# Patient Record
Sex: MALE | Race: WHITE | Employment: OTHER | URBAN - METROPOLITAN AREA
[De-identification: names, ages, dates, MRNs, and addresses within clinical notes are randomized per-mention and may not be internally consistent; named-entity substitution may affect disease eponyms.]

---

## 2018-08-13 ENCOUNTER — OFFICE VISIT (OUTPATIENT)
Dept: OPTOMETRY | Facility: CLINIC | Age: 78
End: 2018-08-13
Payer: MEDICARE

## 2018-08-13 DIAGNOSIS — H04.129 DRY EYE: ICD-10-CM

## 2018-08-13 DIAGNOSIS — E11.9 DIABETES MELLITUS WITHOUT OPHTHALMIC MANIFESTATIONS (H): ICD-10-CM

## 2018-08-13 DIAGNOSIS — H02.889 MEIBOMIAN GLAND DYSFUNCTION: ICD-10-CM

## 2018-08-13 DIAGNOSIS — H52.13 MYOPIA OF BOTH EYES WITH ASTIGMATISM: Primary | ICD-10-CM

## 2018-08-13 DIAGNOSIS — H52.203 MYOPIA OF BOTH EYES WITH ASTIGMATISM: Primary | ICD-10-CM

## 2018-08-13 DIAGNOSIS — Z96.1 PSEUDOPHAKIA OF BOTH EYES: ICD-10-CM

## 2018-08-13 PROBLEM — M17.10 ARTHRITIS OF KNEE: Status: ACTIVE | Noted: 2017-10-27

## 2018-08-13 PROCEDURE — 92015 DETERMINE REFRACTIVE STATE: CPT | Mod: GY | Performed by: OPTOMETRIST

## 2018-08-13 PROCEDURE — 92014 COMPRE OPH EXAM EST PT 1/>: CPT | Performed by: OPTOMETRIST

## 2018-08-13 RX ORDER — GLIPIZIDE 5 MG/1
10 TABLET, FILM COATED, EXTENDED RELEASE ORAL EVERY MORNING
COMMUNITY
Start: 2017-06-15

## 2018-08-13 RX ORDER — ASPIRIN 325 MG
325 TABLET ORAL AT BEDTIME
Status: ON HOLD | COMMUNITY
End: 2019-03-08

## 2018-08-13 RX ORDER — ATORVASTATIN CALCIUM 40 MG/1
40 TABLET, FILM COATED ORAL DAILY
COMMUNITY
Start: 2016-12-21

## 2018-08-13 RX ORDER — LOSARTAN POTASSIUM AND HYDROCHLOROTHIAZIDE 12.5; 1 MG/1; MG/1
1 TABLET ORAL
Status: ON HOLD | COMMUNITY
Start: 2017-06-15 | End: 2019-03-07

## 2018-08-13 RX ORDER — LANCETS
EACH MISCELLANEOUS
COMMUNITY
Start: 2018-03-12

## 2018-08-13 ASSESSMENT — REFRACTION_WEARINGRX
OS_ADD: +2.50
SPECS_TYPE: BIFOCAL
OD_CYLINDER: +3.00
OS_SPHERE: -3.50
OD_AXIS: 022
OS_CYLINDER: +4.00
OD_ADD: +2.50
OS_AXIS: 156
OD_SPHERE: -1.25

## 2018-08-13 ASSESSMENT — CONF VISUAL FIELD
OD_NORMAL: 1
OS_NORMAL: 1

## 2018-08-13 ASSESSMENT — TONOMETRY
OS_IOP_MMHG: 16
IOP_METHOD: APPLANATION
OD_IOP_MMHG: 16

## 2018-08-13 ASSESSMENT — CUP TO DISC RATIO
OS_RATIO: 0.2
OD_RATIO: 0.3

## 2018-08-13 ASSESSMENT — EXTERNAL EXAM - LEFT EYE: OS_EXAM: NORMAL

## 2018-08-13 ASSESSMENT — VISUAL ACUITY
OD_CC: 20/40
OS_SC: 20/70
OD_SC+: -1
OS_CC: 20/40
METHOD: SNELLEN - LINEAR
CORRECTION_TYPE: GLASSES
OS_CC: 20/60
OS_SC+: -1
OD_CC: 20/60
OD_SC: 20/70

## 2018-08-13 ASSESSMENT — REFRACTION_MANIFEST
OD_AXIS: 020
OS_SPHERE: -3.50
OS_ADD: +2.50
OS_CYLINDER: +2.75
OS_AXIS: 155
OD_SPHERE: -1.75
OD_CYLINDER: +2.00
OD_ADD: +2.50

## 2018-08-13 ASSESSMENT — SLIT LAMP EXAM - LIDS
COMMENTS: MEIBOMIAN GLAND DYSFUNCTION
COMMENTS: MEIBOMIAN GLAND DYSFUNCTION

## 2018-08-13 ASSESSMENT — EXTERNAL EXAM - RIGHT EYE: OD_EXAM: NORMAL

## 2018-08-13 NOTE — MR AVS SNAPSHOT
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 A single row of these glands is present in each lid with openings along the lid margins.  It is often found in association with skin conditions such as rosacea and seborrheic dermatitis.    Symptoms include:  ?Red eyes  ?Gritty or burning sensation  ?Excessive tearing  ?Itchy eyelids  ?Red, swollen eyelids  ?Crusting or matting of eyelashes in the morning  ?Light sensitivity  ?Blurred vision    It is important to keep cosmetics from blocking these oil glands. If blocked, they do not   excrete oil into the tear film, which causes the tears to evaporate quickly.   This may result in watery eyes.  There is also an increase of bacterial growth when the tear film is unstable, leading to further ocular surface inflammation.    Warm compresses are very beneficial to the normal functioning of the eye.  Warm compresses for 5-10 minutes twice daily and keeping the lid margins clean  and help maintain a good tear film.   Moisten a washcloth with hot water, or microwave for 10 seconds, being careful to not get the cloth too hot.   Then put the washcloth onto your eyelids for 5 minutes. It will cool quickly so a rice pack or eyemask that can be heated and laid on top of the washcloth will help retain the heat.    Omega 3 fatty acid supplements taken once to twice daily and  "artificial tears such as Soothe xp, Refresh optive , and systane balance are also an additional treatment to control inflammation and help soothe your eyes.    Diabetes weakens the blood vessels all over the body, including the eyes. Damage to the blood vessels in the eyes can cause swelling or bleeding into part of the eye (called the retina). This is called diabetic retinopathy (MARCELA-tin-AH-puh-thee). If not treated, this disease can cause vision loss or blindness.   Symptoms may include blurred or distorted vision, but many people have no symptoms. It's important to see your eye doctor regularly to check for problems.   Early treatment and good control can help protect your vision. Here are the things you can do to help prevent vision loss:      1. Keep your blood sugar levels under tight control.      2. Bring high blood pressure under control.      3. No smoking.      4. Have yearly dilated eye exams.                Follow-ups after your visit        Who to contact     If you have questions or need follow up information about today's clinic visit or your schedule please contact [facility] directly at [phone].  Normal or non-critical lab and imaging results will be communicated to you by [employer]hart, letter or phone within 4 business days after the clinic has received the results. If you do not hear from us within 7 days, please contact the clinic through [employer] or phone. If you have a critical or abnormal lab result, we will notify you by phone as soon as possible.  Submit refill requests through [employer] or call your pharmacy and they will forward the refill request to us. Please allow 3 business days for your refill to be completed.          Additional Information About Your Visit        [facility] Information     [facility] [facility] lets you send messages to your doctor, view your test results, renew your prescriptions, schedule appointments and more. To sign up, go to [email or web address] . Click on \"Log " "in\" on the left side of the screen, which will take you to the Welcome page. Then click on \"Sign up Now\" on the right side of the page.     You will be asked to enter the access code listed below, as well as some personal information. Please follow the directions to create your username and password.     Your access code is: [email or web address]  Expires: 2018  3:11 PM     Your access code will  in 90 days. If you need help or a new code, please call your [place] clinic or [phone].        Care EveryWhere ID     This is your Care EveryWhere ID. This could be used by other organizations to access your [place] medical records  [id number]         Blood Pressure from Last 3 Encounters:   No data found for BP    Weight from Last 3 Encounters:   No data found for Wt              Today, you had the following     No orders found for display       Primary Care Provider Office Phone # Fax #    Yared Velásquez [phone]       [facility] [address] [facility] [address]        Equal Access to Services     [facility]: Hadii aad ku hadasho Socandaceali, waaxda luqadaha, qaybta kaalmada adereyes, pia bishop . So [facility] [place] [phone].    ATENCIÓN: Si habla español, tiene a mahmood disposición servicios gratuitos de asistencia lingüística. Jadyn al [phone].    We comply with applicable federal civil rights laws and [place] laws. We do not discriminate on the basis of race, color, national origin, age, disability, sex, sexual orientation, or gender identity.            Thank you!     Thank you for choosing [facility]  for your care. Our goal is always to provide you with excellent care. Hearing back from our patients is one way we can continue to improve our services. Please take a few minutes to complete the written survey that you may receive in the mail after your visit with us. Thank you!             Your Updated Medication List - " Protect others around you: Learn how to safely use, store and throw away your medicines at www.disposemymeds.org.          This list is accurate as of 8/13/18  3:11 PM.  Always use your most recent med list.                   Brand Name Dispense Instructions for use Diagnosis    aspirin 325 MG tablet      Take 325 mg by mouth        atorvastatin 40 MG tablet    LIPITOR     Take 40 mg by mouth        blood glucose monitoring lancets      USE TO TEST ONCE TO TWICE DAILY        blood glucose monitoring test strip    no brand specified     USE TO TEST ONCE TO TWICE DAILY        glipiZIDE 5 MG 24 hr tablet    GLUCOTROL XL     Take 10 mg by mouth        losartan-hydrochlorothiazide 100-12.5 MG per tablet    HYZAAR     Take 1 tablet by mouth        metFORMIN 500 MG tablet    GLUCOPHAGE     TAKE TWO TABLETS BY MOUTH EVERY MORNING AND ONE TABLET IN THE PM.        VITAMIN D (ERGOCALCIFEROL) PO      Take 200 Int'l Units by mouth

## 2018-08-13 NOTE — LETTER
8/13/2018         RE: Aramis Flores  [address] [facility] [address]        Dear Colleague,    [facility]. Please see a copy of my visit note below.    Chief Complaint   Patient presents with     Diabetic Eye Exam      Last A1c was too high increased Metformin and is seeing Dr Sierra in one month   100-130 in am checks in morning   Happy with cat surgery   Last Eye Exam: 2yrs  Dilated Previously: Yes    What are you currently using to see?  glasses  POHX +7.00 pre CE   OD: +1.75 - 2.25 X 111 VA: 20/40  OS: +0.50 - 3.00 X 064 VA: 20/40+2  ADD: + 2.50     Distance Vision Acuity: Satisfied with vision    Near Vision Acuity: Satisfied with vision while reading  with glasses    Eye Comfort: good  Do you use eye drops? : No        Medical, surgical and family histories reviewed and updated 8/13/2018.       OBJECTIVE: See Ophthalmology exam    ASSESSMENT:    ICD-10-CM    1. Myopia of both eyes with astigmatism H52.13 EYE EXAM (SIMPLE-NONBILLABLE)    H52.203 REFRACTION   2. Meibomian gland dysfunction H02.89 EYE EXAM (SIMPLE-NONBILLABLE)   3. Dry eye H04.129    4. Pseudophakia of both eyes Z96.1 EYE EXAM (SIMPLE-NONBILLABLE)   5. 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        Sincerely,        Nellie Durbin, OD

## 2018-08-13 NOTE — PATIENT INSTRUCTIONS
Meibomian gland dysfunction or Posterior Blepharitis, is characterized by inflammation along both the uppper and lower eyelid margins. A single row of these glands is present in each lid with openings along the lid margins.  It is often found in association with skin conditions such as rosacea and seborrheic dermatitis.    Symptoms include:  ?Red eyes  ?Gritty or burning sensation  ?Excessive tearing  ?Itchy eyelids  ?Red, swollen eyelids  ?Crusting or matting of eyelashes in the morning  ?Light sensitivity  ?Blurred vision    It is important to keep cosmetics from blocking these oil glands. If blocked, they do not   excrete oil into the tear film, which causes the tears to evaporate quickly.   This may result in watery eyes.  There is also an increase of bacterial growth when the tear film is unstable, leading to further ocular surface inflammation.    Warm compresses are very beneficial to the normal functioning of the eye.  Warm compresses for 5-10 minutes twice daily and keeping the lid margins clean  and help maintain a good tear film.   Moisten a washcloth with hot water, or microwave for 10 seconds, being careful to not get the cloth too hot.   Then put the washcloth onto your eyelids for 5 minutes. It will cool quickly so a rice pack or eyemask that can be heated and laid on top of the washcloth will help retain the heat.    Omega 3 fatty acid supplements taken once to twice daily and artificial tears such as Soothe xp, Refresh optive , and systane balance are also an additional treatment to control inflammation and help soothe your eyes.    Diabetes weakens the blood vessels all over the body, including the eyes. Damage to the blood vessels in the eyes can cause swelling or bleeding into part of the eye (called the retina). This is called diabetic retinopathy (MARCELA-tin--puh-thee). If not treated, this disease can cause vision loss or blindness.   Symptoms may include blurred or distorted vision, but many  people have no symptoms. It's important to see your eye doctor regularly to check for problems.   Early treatment and good control can help protect your vision. Here are the things you can do to help prevent vision loss:      1. Keep your blood sugar levels under tight control.      2. Bring high blood pressure under control.      3. No smoking.      4. Have yearly dilated eye exams.

## 2018-08-13 NOTE — PROGRESS NOTES
Chief Complaint   Patient presents with     Diabetic Eye Exam      Last A1c was too high increased Metformin and is seeing Dr Sierra in one month   100-130 in am checks in morning   Happy with cat surgery   Last Eye Exam: 2yrs  Dilated Previously: Yes    What are you currently using to see?  glasses  POHX +7.00 pre CE   OD: +1.75 - 2.25 X 111 VA: 20/40  OS: +0.50 - 3.00 X 064 VA: 20/40+2  ADD: + 2.50     Distance Vision Acuity: Satisfied with vision    Near Vision Acuity: Satisfied with vision while reading  with glasses    Eye Comfort: good  Do you use eye drops? : No        Medical, surgical and family histories reviewed and updated 8/13/2018.       OBJECTIVE: See Ophthalmology exam    ASSESSMENT:    ICD-10-CM    1. Myopia of both eyes with astigmatism H52.13 EYE EXAM (SIMPLE-NONBILLABLE)    H52.203 REFRACTION   2. Meibomian gland dysfunction H02.89 EYE EXAM (SIMPLE-NONBILLABLE)   3. Dry eye H04.129    4. Pseudophakia of both eyes Z96.1 EYE EXAM (SIMPLE-NONBILLABLE)   5. 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

## 2019-02-18 ENCOUNTER — HOSPITAL ENCOUNTER (OUTPATIENT)
Dept: LAB | Facility: CLINIC | Age: 79
Discharge: HOME | End: 2019-02-18
Attending: ORTHOPAEDIC SURGERY | Admitting: ORTHOPAEDIC SURGERY
Payer: MEDICARE

## 2019-02-18 DIAGNOSIS — Z01.812 PRE-OPERATIVE LABORATORY EXAMINATION: ICD-10-CM

## 2019-02-18 LAB
MRSA DNA SPEC QL NAA+PROBE: NEGATIVE
SPECIMEN SOURCE: NORMAL

## 2019-02-18 PROCEDURE — 87640 STAPH A DNA AMP PROBE: CPT | Performed by: ORTHOPAEDIC SURGERY

## 2019-02-18 PROCEDURE — 40000830 ZZHCL STATISTIC STAPH AUREUS METH RESIST SCREEN PCR: Performed by: ORTHOPAEDIC SURGERY

## 2019-02-18 PROCEDURE — 40000829 ZZHCL STATISTIC STAPH AUREUS SUSCEPT SCREEN PCR: Performed by: ORTHOPAEDIC SURGERY

## 2019-02-18 PROCEDURE — 87641 MR-STAPH DNA AMP PROBE: CPT | Performed by: ORTHOPAEDIC SURGERY

## 2019-03-07 ENCOUNTER — ANESTHESIA (OUTPATIENT)
Dept: SURGERY | Facility: CLINIC | Age: 79
End: 2019-03-07
Payer: MEDICARE

## 2019-03-07 ENCOUNTER — APPOINTMENT (OUTPATIENT)
Dept: GENERAL RADIOLOGY | Facility: CLINIC | Age: 79
End: 2019-03-07
Attending: ORTHOPAEDIC SURGERY
Payer: MEDICARE

## 2019-03-07 ENCOUNTER — ANESTHESIA EVENT (OUTPATIENT)
Dept: SURGERY | Facility: CLINIC | Age: 79
End: 2019-03-07
Payer: MEDICARE

## 2019-03-07 ENCOUNTER — HOSPITAL ENCOUNTER (OUTPATIENT)
Facility: CLINIC | Age: 79
Discharge: HOME | End: 2019-03-08
Attending: ORTHOPAEDIC SURGERY | Admitting: ORTHOPAEDIC SURGERY
Payer: MEDICARE

## 2019-03-07 DIAGNOSIS — Z96.652 STATUS POST TOTAL KNEE REPLACEMENT, LEFT: Primary | ICD-10-CM

## 2019-03-07 LAB
CREAT SERPL-MCNC: 1.12 MG/DL (ref 0.66–1.25)
GFR SERPL CREATININE-BSD FRML MDRD: 62 ML/MIN/{1.73_M2}
GLUCOSE BLDC GLUCOMTR-MCNC: 109 MG/DL (ref 70–99)
GLUCOSE BLDC GLUCOMTR-MCNC: 115 MG/DL (ref 70–99)
GLUCOSE BLDC GLUCOMTR-MCNC: 140 MG/DL (ref 70–99)
GLUCOSE BLDC GLUCOMTR-MCNC: 244 MG/DL (ref 70–99)
GLUCOSE SERPL-MCNC: 150 MG/DL (ref 70–99)
HGB BLD-MCNC: 13.1 G/DL (ref 13.3–17.7)
POTASSIUM SERPL-SCNC: 4.2 MMOL/L (ref 3.4–5.3)

## 2019-03-07 PROCEDURE — 84132 ASSAY OF SERUM POTASSIUM: CPT | Performed by: PHYSICIAN ASSISTANT

## 2019-03-07 PROCEDURE — 27810169 ZZH OR IMPLANT GENERAL: Performed by: ORTHOPAEDIC SURGERY

## 2019-03-07 PROCEDURE — 99204 OFFICE O/P NEW MOD 45 MIN: CPT | Performed by: PHYSICIAN ASSISTANT

## 2019-03-07 PROCEDURE — 25000128 H RX IP 250 OP 636: Performed by: ORTHOPAEDIC SURGERY

## 2019-03-07 PROCEDURE — 25000132 ZZH RX MED GY IP 250 OP 250 PS 637: Mod: GY | Performed by: PHYSICIAN ASSISTANT

## 2019-03-07 PROCEDURE — 25800030 ZZH RX IP 258 OP 636: Performed by: SURGERY

## 2019-03-07 PROCEDURE — 71000012 ZZH RECOVERY PHASE 1 LEVEL 1 FIRST HR: Performed by: ORTHOPAEDIC SURGERY

## 2019-03-07 PROCEDURE — 25000125 ZZHC RX 250: Performed by: PHYSICIAN ASSISTANT

## 2019-03-07 PROCEDURE — A9270 NON-COVERED ITEM OR SERVICE: HCPCS | Mod: GY | Performed by: PHYSICIAN ASSISTANT

## 2019-03-07 PROCEDURE — 88304 TISSUE EXAM BY PATHOLOGIST: CPT | Mod: 26 | Performed by: ORTHOPAEDIC SURGERY

## 2019-03-07 PROCEDURE — 25800030 ZZH RX IP 258 OP 636: Performed by: NURSE ANESTHETIST, CERTIFIED REGISTERED

## 2019-03-07 PROCEDURE — 25000125 ZZHC RX 250: Performed by: NURSE ANESTHETIST, CERTIFIED REGISTERED

## 2019-03-07 PROCEDURE — 40000986 XR KNEE PORT LT 1/2 VW: Mod: LT

## 2019-03-07 PROCEDURE — 25000125 ZZHC RX 250: Performed by: ORTHOPAEDIC SURGERY

## 2019-03-07 PROCEDURE — 25000128 H RX IP 250 OP 636: Performed by: NURSE ANESTHETIST, CERTIFIED REGISTERED

## 2019-03-07 PROCEDURE — 37000009 ZZH ANESTHESIA TECHNICAL FEE, EACH ADDTL 15 MIN: Performed by: ORTHOPAEDIC SURGERY

## 2019-03-07 PROCEDURE — A9270 NON-COVERED ITEM OR SERVICE: HCPCS | Mod: GY | Performed by: ORTHOPAEDIC SURGERY

## 2019-03-07 PROCEDURE — 36415 COLL VENOUS BLD VENIPUNCTURE: CPT | Performed by: PHYSICIAN ASSISTANT

## 2019-03-07 PROCEDURE — 25000128 H RX IP 250 OP 636: Performed by: ANESTHESIOLOGY

## 2019-03-07 PROCEDURE — 37000008 ZZH ANESTHESIA TECHNICAL FEE, 1ST 30 MIN: Performed by: ORTHOPAEDIC SURGERY

## 2019-03-07 PROCEDURE — 85018 HEMOGLOBIN: CPT | Performed by: PHYSICIAN ASSISTANT

## 2019-03-07 PROCEDURE — 40000170 ZZH STATISTIC PRE-PROCEDURE ASSESSMENT II: Performed by: ORTHOPAEDIC SURGERY

## 2019-03-07 PROCEDURE — 27210794 ZZH OR GENERAL SUPPLY STERILE: Performed by: ORTHOPAEDIC SURGERY

## 2019-03-07 PROCEDURE — 82565 ASSAY OF CREATININE: CPT | Performed by: PHYSICIAN ASSISTANT

## 2019-03-07 PROCEDURE — 25800025 ZZH RX 258: Performed by: ORTHOPAEDIC SURGERY

## 2019-03-07 PROCEDURE — 36000093 ZZH SURGERY LEVEL 4 1ST 30 MIN: Performed by: ORTHOPAEDIC SURGERY

## 2019-03-07 PROCEDURE — C1776 JOINT DEVICE (IMPLANTABLE): HCPCS | Performed by: ORTHOPAEDIC SURGERY

## 2019-03-07 PROCEDURE — 36000063 ZZH SURGERY LEVEL 4 EA 15 ADDTL MIN: Performed by: ORTHOPAEDIC SURGERY

## 2019-03-07 PROCEDURE — 88311 DECALCIFY TISSUE: CPT | Performed by: ORTHOPAEDIC SURGERY

## 2019-03-07 PROCEDURE — 82947 ASSAY GLUCOSE BLOOD QUANT: CPT | Mod: 91 | Performed by: PHYSICIAN ASSISTANT

## 2019-03-07 PROCEDURE — 25000132 ZZH RX MED GY IP 250 OP 250 PS 637: Mod: GY | Performed by: ORTHOPAEDIC SURGERY

## 2019-03-07 PROCEDURE — 82962 GLUCOSE BLOOD TEST: CPT | Mod: 91

## 2019-03-07 PROCEDURE — 25800030 ZZH RX IP 258 OP 636: Performed by: PHYSICIAN ASSISTANT

## 2019-03-07 PROCEDURE — 88311 DECALCIFY TISSUE: CPT | Mod: 26 | Performed by: ORTHOPAEDIC SURGERY

## 2019-03-07 PROCEDURE — 25800030 ZZH RX IP 258 OP 636: Performed by: ORTHOPAEDIC SURGERY

## 2019-03-07 PROCEDURE — 88304 TISSUE EXAM BY PATHOLOGIST: CPT | Performed by: ORTHOPAEDIC SURGERY

## 2019-03-07 PROCEDURE — 25000128 H RX IP 250 OP 636: Performed by: PHYSICIAN ASSISTANT

## 2019-03-07 DEVICE — BONE CEMENT RADIOPAQUE SIMPLEX HV FULL DOSE 6194-1-001: Type: IMPLANTABLE DEVICE | Site: KNEE | Status: FUNCTIONAL

## 2019-03-07 DEVICE — IMP COMP FEM STRK TRIATHLN PS LT 4 5515-F-401: Type: IMPLANTABLE DEVICE | Site: KNEE | Status: FUNCTIONAL

## 2019-03-07 DEVICE — IMP COMP PATELLA HOWM TRI 38 10MM 5551-L-381: Type: IMPLANTABLE DEVICE | Site: KNEE | Status: FUNCTIONAL

## 2019-03-07 DEVICE — IMP INSERT BASEPLATE TIBIAL HOWM TRI 4 5521-B-400: Type: IMPLANTABLE DEVICE | Site: KNEE | Status: FUNCTIONAL

## 2019-03-07 DEVICE — IMP BONE CEMENT SIMPLEX W/GENTAMICIN 6195-1-001: Type: IMPLANTABLE DEVICE | Site: KNEE | Status: FUNCTIONAL

## 2019-03-07 DEVICE — IMPLANTABLE DEVICE: Type: IMPLANTABLE DEVICE | Site: KNEE | Status: FUNCTIONAL

## 2019-03-07 DEVICE — IMP PEG DISTAL FEMORAL STRK 5575-X-000: Type: IMPLANTABLE DEVICE | Site: KNEE | Status: FUNCTIONAL

## 2019-03-07 RX ORDER — CEFAZOLIN SODIUM 2 G/100ML
2 INJECTION, SOLUTION INTRAVENOUS
Status: COMPLETED | OUTPATIENT
Start: 2019-03-07 | End: 2019-03-07

## 2019-03-07 RX ORDER — CEFAZOLIN SODIUM 1 G/3ML
1 INJECTION, POWDER, FOR SOLUTION INTRAMUSCULAR; INTRAVENOUS SEE ADMIN INSTRUCTIONS
Status: DISCONTINUED | OUTPATIENT
Start: 2019-03-07 | End: 2019-03-07 | Stop reason: HOSPADM

## 2019-03-07 RX ORDER — MULTIVIT WITH MINERALS/LUTEIN
1 TABLET ORAL DAILY
COMMUNITY

## 2019-03-07 RX ORDER — LABETALOL HYDROCHLORIDE 5 MG/ML
10 INJECTION, SOLUTION INTRAVENOUS
Status: DISCONTINUED | OUTPATIENT
Start: 2019-03-07 | End: 2019-03-07 | Stop reason: HOSPADM

## 2019-03-07 RX ORDER — NICOTINE POLACRILEX 4 MG
15-30 LOZENGE BUCCAL
Status: DISCONTINUED | OUTPATIENT
Start: 2019-03-07 | End: 2019-03-08 | Stop reason: HOSPADM

## 2019-03-07 RX ORDER — DEXTROSE MONOHYDRATE 25 G/50ML
25-50 INJECTION, SOLUTION INTRAVENOUS
Status: DISCONTINUED | OUTPATIENT
Start: 2019-03-07 | End: 2019-03-08 | Stop reason: HOSPADM

## 2019-03-07 RX ORDER — NALOXONE HYDROCHLORIDE 0.4 MG/ML
.1-.4 INJECTION, SOLUTION INTRAMUSCULAR; INTRAVENOUS; SUBCUTANEOUS
Status: DISCONTINUED | OUTPATIENT
Start: 2019-03-07 | End: 2019-03-07 | Stop reason: HOSPADM

## 2019-03-07 RX ORDER — NALOXONE HYDROCHLORIDE 0.4 MG/ML
.1-.4 INJECTION, SOLUTION INTRAMUSCULAR; INTRAVENOUS; SUBCUTANEOUS
Status: DISCONTINUED | OUTPATIENT
Start: 2019-03-07 | End: 2019-03-08 | Stop reason: HOSPADM

## 2019-03-07 RX ORDER — HYDROMORPHONE HYDROCHLORIDE 1 MG/ML
.3-.5 INJECTION, SOLUTION INTRAMUSCULAR; INTRAVENOUS; SUBCUTANEOUS EVERY 10 MIN PRN
Status: DISCONTINUED | OUTPATIENT
Start: 2019-03-07 | End: 2019-03-07 | Stop reason: HOSPADM

## 2019-03-07 RX ORDER — CELECOXIB 200 MG/1
400 CAPSULE ORAL ONCE
Status: COMPLETED | OUTPATIENT
Start: 2019-03-07 | End: 2019-03-07

## 2019-03-07 RX ORDER — SODIUM CHLORIDE, SODIUM LACTATE, POTASSIUM CHLORIDE, CALCIUM CHLORIDE 600; 310; 30; 20 MG/100ML; MG/100ML; MG/100ML; MG/100ML
INJECTION, SOLUTION INTRAVENOUS CONTINUOUS
Status: DISCONTINUED | OUTPATIENT
Start: 2019-03-07 | End: 2019-03-07 | Stop reason: HOSPADM

## 2019-03-07 RX ORDER — ONDANSETRON 2 MG/ML
4 INJECTION INTRAMUSCULAR; INTRAVENOUS EVERY 6 HOURS PRN
Status: DISCONTINUED | OUTPATIENT
Start: 2019-03-07 | End: 2019-03-08 | Stop reason: HOSPADM

## 2019-03-07 RX ORDER — TEMAZEPAM 7.5 MG/1
7.5 CAPSULE ORAL
Status: DISCONTINUED | OUTPATIENT
Start: 2019-03-08 | End: 2019-03-08 | Stop reason: HOSPADM

## 2019-03-07 RX ORDER — CEFAZOLIN SODIUM 2 G/100ML
2 INJECTION, SOLUTION INTRAVENOUS EVERY 8 HOURS
Status: COMPLETED | OUTPATIENT
Start: 2019-03-07 | End: 2019-03-08

## 2019-03-07 RX ORDER — PROCHLORPERAZINE MALEATE 5 MG
5 TABLET ORAL EVERY 6 HOURS PRN
Status: DISCONTINUED | OUTPATIENT
Start: 2019-03-07 | End: 2019-03-08 | Stop reason: HOSPADM

## 2019-03-07 RX ORDER — MEPERIDINE HYDROCHLORIDE 25 MG/ML
12.5 INJECTION INTRAMUSCULAR; INTRAVENOUS; SUBCUTANEOUS
Status: DISCONTINUED | OUTPATIENT
Start: 2019-03-07 | End: 2019-03-07 | Stop reason: HOSPADM

## 2019-03-07 RX ORDER — VANCOMYCIN HYDROCHLORIDE 1 G/20ML
INJECTION, POWDER, LYOPHILIZED, FOR SOLUTION INTRAVENOUS PRN
Status: DISCONTINUED | OUTPATIENT
Start: 2019-03-07 | End: 2019-03-07 | Stop reason: HOSPADM

## 2019-03-07 RX ORDER — FENTANYL CITRATE 50 UG/ML
INJECTION, SOLUTION INTRAMUSCULAR; INTRAVENOUS PRN
Status: DISCONTINUED | OUTPATIENT
Start: 2019-03-07 | End: 2019-03-07

## 2019-03-07 RX ORDER — SODIUM CHLORIDE 9 MG/ML
INJECTION, SOLUTION INTRAVENOUS CONTINUOUS
Status: DISCONTINUED | OUTPATIENT
Start: 2019-03-07 | End: 2019-03-08 | Stop reason: HOSPADM

## 2019-03-07 RX ORDER — ACETAMINOPHEN 325 MG/1
975 TABLET ORAL EVERY 8 HOURS
Status: DISCONTINUED | OUTPATIENT
Start: 2019-03-07 | End: 2019-03-08 | Stop reason: HOSPADM

## 2019-03-07 RX ORDER — LIDOCAINE 40 MG/G
CREAM TOPICAL
Status: DISCONTINUED | OUTPATIENT
Start: 2019-03-07 | End: 2019-03-08 | Stop reason: HOSPADM

## 2019-03-07 RX ORDER — CHLORAL HYDRATE 500 MG
1 CAPSULE ORAL DAILY
COMMUNITY

## 2019-03-07 RX ORDER — PROPOFOL 10 MG/ML
INJECTION, EMULSION INTRAVENOUS CONTINUOUS PRN
Status: DISCONTINUED | OUTPATIENT
Start: 2019-03-07 | End: 2019-03-07

## 2019-03-07 RX ORDER — ONDANSETRON 4 MG/1
4 TABLET, ORALLY DISINTEGRATING ORAL EVERY 30 MIN PRN
Status: DISCONTINUED | OUTPATIENT
Start: 2019-03-07 | End: 2019-03-07 | Stop reason: HOSPADM

## 2019-03-07 RX ORDER — FENTANYL CITRATE 50 UG/ML
25-50 INJECTION, SOLUTION INTRAMUSCULAR; INTRAVENOUS
Status: DISCONTINUED | OUTPATIENT
Start: 2019-03-07 | End: 2019-03-07 | Stop reason: HOSPADM

## 2019-03-07 RX ORDER — KETOROLAC TROMETHAMINE 15 MG/ML
15 INJECTION, SOLUTION INTRAMUSCULAR; INTRAVENOUS EVERY 6 HOURS
Status: COMPLETED | OUTPATIENT
Start: 2019-03-07 | End: 2019-03-08

## 2019-03-07 RX ORDER — KETOROLAC TROMETHAMINE 30 MG/ML
INJECTION, SOLUTION INTRAMUSCULAR; INTRAVENOUS PRN
Status: DISCONTINUED | OUTPATIENT
Start: 2019-03-07 | End: 2019-03-07 | Stop reason: HOSPADM

## 2019-03-07 RX ORDER — ACETAMINOPHEN 650 MG/1
650 SUPPOSITORY RECTAL EVERY 4 HOURS PRN
Status: DISCONTINUED | OUTPATIENT
Start: 2019-03-07 | End: 2019-03-07 | Stop reason: HOSPADM

## 2019-03-07 RX ORDER — ATORVASTATIN CALCIUM 20 MG/1
40 TABLET, FILM COATED ORAL DAILY
Status: DISCONTINUED | OUTPATIENT
Start: 2019-03-08 | End: 2019-03-08 | Stop reason: HOSPADM

## 2019-03-07 RX ORDER — BUPIVACAINE HYDROCHLORIDE 7.5 MG/ML
INJECTION, SOLUTION INTRASPINAL PRN
Status: DISCONTINUED | OUTPATIENT
Start: 2019-03-07 | End: 2019-03-07

## 2019-03-07 RX ORDER — ONDANSETRON 4 MG/1
4 TABLET, ORALLY DISINTEGRATING ORAL EVERY 6 HOURS PRN
Status: DISCONTINUED | OUTPATIENT
Start: 2019-03-07 | End: 2019-03-08 | Stop reason: HOSPADM

## 2019-03-07 RX ORDER — ACETAMINOPHEN 325 MG/1
650 TABLET ORAL EVERY 4 HOURS PRN
Status: DISCONTINUED | OUTPATIENT
Start: 2019-03-10 | End: 2019-03-08 | Stop reason: HOSPADM

## 2019-03-07 RX ORDER — MINOCYCLINE HYDROCHLORIDE 100 MG/1
100 CAPSULE ORAL DAILY
COMMUNITY
End: 2021-01-11

## 2019-03-07 RX ORDER — HYDROMORPHONE HYDROCHLORIDE 1 MG/ML
.3-.5 INJECTION, SOLUTION INTRAMUSCULAR; INTRAVENOUS; SUBCUTANEOUS
Status: DISCONTINUED | OUTPATIENT
Start: 2019-03-07 | End: 2019-03-08 | Stop reason: HOSPADM

## 2019-03-07 RX ORDER — FENTANYL CITRATE 50 UG/ML
25-100 INJECTION, SOLUTION INTRAMUSCULAR; INTRAVENOUS
Status: DISCONTINUED | OUTPATIENT
Start: 2019-03-07 | End: 2019-03-07 | Stop reason: HOSPADM

## 2019-03-07 RX ORDER — PREGABALIN 75 MG/1
75 CAPSULE ORAL DAILY
Status: COMPLETED | OUTPATIENT
Start: 2019-03-07 | End: 2019-03-07

## 2019-03-07 RX ORDER — ONDANSETRON 2 MG/ML
4 INJECTION INTRAMUSCULAR; INTRAVENOUS EVERY 30 MIN PRN
Status: DISCONTINUED | OUTPATIENT
Start: 2019-03-07 | End: 2019-03-07 | Stop reason: HOSPADM

## 2019-03-07 RX ORDER — OXYCODONE HYDROCHLORIDE 5 MG/1
5-10 TABLET ORAL
Status: DISCONTINUED | OUTPATIENT
Start: 2019-03-07 | End: 2019-03-08 | Stop reason: HOSPADM

## 2019-03-07 RX ORDER — DIAZEPAM 10 MG/2ML
2.5 INJECTION, SOLUTION INTRAMUSCULAR; INTRAVENOUS
Status: DISCONTINUED | OUTPATIENT
Start: 2019-03-07 | End: 2019-03-07 | Stop reason: HOSPADM

## 2019-03-07 RX ORDER — HYDROXYZINE HYDROCHLORIDE 10 MG/1
10 TABLET, FILM COATED ORAL EVERY 6 HOURS PRN
Status: DISCONTINUED | OUTPATIENT
Start: 2019-03-07 | End: 2019-03-08 | Stop reason: HOSPADM

## 2019-03-07 RX ORDER — AMOXICILLIN 250 MG
1 CAPSULE ORAL 2 TIMES DAILY
Status: DISCONTINUED | OUTPATIENT
Start: 2019-03-07 | End: 2019-03-08 | Stop reason: HOSPADM

## 2019-03-07 RX ORDER — GLIPIZIDE 10 MG/1
10 TABLET, FILM COATED, EXTENDED RELEASE ORAL EVERY MORNING
Status: ON HOLD | COMMUNITY
End: 2019-03-07

## 2019-03-07 RX ORDER — ACETAMINOPHEN 500 MG
1000 TABLET ORAL ONCE
Status: COMPLETED | OUTPATIENT
Start: 2019-03-07 | End: 2019-03-07

## 2019-03-07 RX ORDER — PROPOFOL 10 MG/ML
INJECTION, EMULSION INTRAVENOUS PRN
Status: DISCONTINUED | OUTPATIENT
Start: 2019-03-07 | End: 2019-03-07

## 2019-03-07 RX ORDER — AMOXICILLIN 250 MG
2 CAPSULE ORAL 2 TIMES DAILY
Status: DISCONTINUED | OUTPATIENT
Start: 2019-03-07 | End: 2019-03-08 | Stop reason: HOSPADM

## 2019-03-07 RX ORDER — CHOLECALCIFEROL (VITAMIN D3) 50 MCG
1 TABLET ORAL DAILY
COMMUNITY

## 2019-03-07 RX ADMIN — PREGABALIN 75 MG: 75 CAPSULE ORAL at 10:39

## 2019-03-07 RX ADMIN — PHENYLEPHRINE HYDROCHLORIDE 100 MCG: 10 INJECTION, SOLUTION INTRAMUSCULAR; INTRAVENOUS; SUBCUTANEOUS at 12:59

## 2019-03-07 RX ADMIN — PHENYLEPHRINE HYDROCHLORIDE 100 MCG: 10 INJECTION, SOLUTION INTRAMUSCULAR; INTRAVENOUS; SUBCUTANEOUS at 13:27

## 2019-03-07 RX ADMIN — MIDAZOLAM HYDROCHLORIDE 1 MG: 1 INJECTION, SOLUTION INTRAMUSCULAR; INTRAVENOUS at 11:05

## 2019-03-07 RX ADMIN — BUPIVACAINE HYDROCHLORIDE IN DEXTROSE 12.75 MG: 7.5 INJECTION, SOLUTION SUBARACHNOID at 11:55

## 2019-03-07 RX ADMIN — PHENYLEPHRINE HYDROCHLORIDE 100 MCG: 10 INJECTION, SOLUTION INTRAMUSCULAR; INTRAVENOUS; SUBCUTANEOUS at 13:06

## 2019-03-07 RX ADMIN — CELECOXIB 400 MG: 200 CAPSULE ORAL at 10:39

## 2019-03-07 RX ADMIN — ROPIVACAINE HYDROCHLORIDE 20 ML GIVEN: 5 INJECTION, SOLUTION EPIDURAL; INFILTRATION; PERINEURAL at 11:09

## 2019-03-07 RX ADMIN — SENNOSIDES AND DOCUSATE SODIUM 1 TABLET: 8.6; 5 TABLET ORAL at 20:58

## 2019-03-07 RX ADMIN — PROPOFOL 30 MCG/KG/MIN: 10 INJECTION, EMULSION INTRAVENOUS at 12:05

## 2019-03-07 RX ADMIN — FENTANYL CITRATE 50 MCG: 50 INJECTION, SOLUTION INTRAMUSCULAR; INTRAVENOUS at 11:05

## 2019-03-07 RX ADMIN — PHENYLEPHRINE HYDROCHLORIDE 100 MCG: 10 INJECTION, SOLUTION INTRAMUSCULAR; INTRAVENOUS; SUBCUTANEOUS at 13:45

## 2019-03-07 RX ADMIN — CEFAZOLIN 1 G: 1 INJECTION, POWDER, FOR SOLUTION INTRAMUSCULAR; INTRAVENOUS at 14:00

## 2019-03-07 RX ADMIN — PHENYLEPHRINE HYDROCHLORIDE 100 MCG: 10 INJECTION, SOLUTION INTRAMUSCULAR; INTRAVENOUS; SUBCUTANEOUS at 12:45

## 2019-03-07 RX ADMIN — PHENYLEPHRINE HYDROCHLORIDE 100 MCG: 10 INJECTION, SOLUTION INTRAMUSCULAR; INTRAVENOUS; SUBCUTANEOUS at 13:32

## 2019-03-07 RX ADMIN — PHENYLEPHRINE HYDROCHLORIDE 100 MCG: 10 INJECTION, SOLUTION INTRAMUSCULAR; INTRAVENOUS; SUBCUTANEOUS at 12:51

## 2019-03-07 RX ADMIN — PHENYLEPHRINE HYDROCHLORIDE 100 MCG: 10 INJECTION, SOLUTION INTRAMUSCULAR; INTRAVENOUS; SUBCUTANEOUS at 12:23

## 2019-03-07 RX ADMIN — Medication 0.2 MG: at 16:05

## 2019-03-07 RX ADMIN — SODIUM CHLORIDE, POTASSIUM CHLORIDE, SODIUM LACTATE AND CALCIUM CHLORIDE: 600; 310; 30; 20 INJECTION, SOLUTION INTRAVENOUS at 14:01

## 2019-03-07 RX ADMIN — PHENYLEPHRINE HYDROCHLORIDE 100 MCG: 10 INJECTION, SOLUTION INTRAMUSCULAR; INTRAVENOUS; SUBCUTANEOUS at 12:36

## 2019-03-07 RX ADMIN — PHENYLEPHRINE HYDROCHLORIDE 100 MCG: 10 INJECTION, SOLUTION INTRAMUSCULAR; INTRAVENOUS; SUBCUTANEOUS at 12:21

## 2019-03-07 RX ADMIN — PHENYLEPHRINE HYDROCHLORIDE 100 MCG: 10 INJECTION, SOLUTION INTRAMUSCULAR; INTRAVENOUS; SUBCUTANEOUS at 12:40

## 2019-03-07 RX ADMIN — Medication 0.3 MG: at 15:48

## 2019-03-07 RX ADMIN — PHENYLEPHRINE HYDROCHLORIDE 100 MCG: 10 INJECTION, SOLUTION INTRAMUSCULAR; INTRAVENOUS; SUBCUTANEOUS at 13:22

## 2019-03-07 RX ADMIN — PHENYLEPHRINE HYDROCHLORIDE 50 MCG: 10 INJECTION, SOLUTION INTRAMUSCULAR; INTRAVENOUS; SUBCUTANEOUS at 12:19

## 2019-03-07 RX ADMIN — SODIUM CHLORIDE, POTASSIUM CHLORIDE, SODIUM LACTATE AND CALCIUM CHLORIDE: 600; 310; 30; 20 INJECTION, SOLUTION INTRAVENOUS at 12:38

## 2019-03-07 RX ADMIN — PHENYLEPHRINE HYDROCHLORIDE 50 MCG: 10 INJECTION, SOLUTION INTRAMUSCULAR; INTRAVENOUS; SUBCUTANEOUS at 12:11

## 2019-03-07 RX ADMIN — PHENYLEPHRINE HYDROCHLORIDE 100 MCG: 10 INJECTION, SOLUTION INTRAMUSCULAR; INTRAVENOUS; SUBCUTANEOUS at 13:15

## 2019-03-07 RX ADMIN — PHENYLEPHRINE HYDROCHLORIDE 100 MCG: 10 INJECTION, SOLUTION INTRAMUSCULAR; INTRAVENOUS; SUBCUTANEOUS at 13:39

## 2019-03-07 RX ADMIN — CEFAZOLIN SODIUM 2 G: 2 INJECTION, SOLUTION INTRAVENOUS at 21:32

## 2019-03-07 RX ADMIN — SODIUM CHLORIDE 1 G: 9 INJECTION, SOLUTION INTRAVENOUS at 14:02

## 2019-03-07 RX ADMIN — PROPOFOL 20 MG: 10 INJECTION, EMULSION INTRAVENOUS at 14:12

## 2019-03-07 RX ADMIN — PHENYLEPHRINE HYDROCHLORIDE 100 MCG: 10 INJECTION, SOLUTION INTRAMUSCULAR; INTRAVENOUS; SUBCUTANEOUS at 12:32

## 2019-03-07 RX ADMIN — PHENYLEPHRINE HYDROCHLORIDE 100 MCG: 10 INJECTION, SOLUTION INTRAMUSCULAR; INTRAVENOUS; SUBCUTANEOUS at 12:28

## 2019-03-07 RX ADMIN — SODIUM CHLORIDE 1 G: 9 INJECTION, SOLUTION INTRAVENOUS at 12:14

## 2019-03-07 RX ADMIN — PHENYLEPHRINE HYDROCHLORIDE 50 MCG: 10 INJECTION, SOLUTION INTRAMUSCULAR; INTRAVENOUS; SUBCUTANEOUS at 12:15

## 2019-03-07 RX ADMIN — PHENYLEPHRINE HYDROCHLORIDE 50 MCG: 10 INJECTION, SOLUTION INTRAMUSCULAR; INTRAVENOUS; SUBCUTANEOUS at 12:05

## 2019-03-07 RX ADMIN — FENTANYL CITRATE 50 MCG: 50 INJECTION, SOLUTION INTRAMUSCULAR; INTRAVENOUS at 11:57

## 2019-03-07 RX ADMIN — ACETAMINOPHEN 1000 MG: 500 TABLET, FILM COATED ORAL at 10:38

## 2019-03-07 RX ADMIN — KETOROLAC TROMETHAMINE 15 MG: 15 INJECTION, SOLUTION INTRAMUSCULAR; INTRAVENOUS at 20:59

## 2019-03-07 RX ADMIN — CEFAZOLIN SODIUM 2 G: 2 INJECTION, SOLUTION INTRAVENOUS at 12:06

## 2019-03-07 RX ADMIN — SODIUM CHLORIDE: 9 INJECTION, SOLUTION INTRAVENOUS at 17:04

## 2019-03-07 RX ADMIN — ACETAMINOPHEN 975 MG: 325 TABLET, FILM COATED ORAL at 18:24

## 2019-03-07 RX ADMIN — SODIUM CHLORIDE, POTASSIUM CHLORIDE, SODIUM LACTATE AND CALCIUM CHLORIDE: 600; 310; 30; 20 INJECTION, SOLUTION INTRAVENOUS at 10:41

## 2019-03-07 ASSESSMENT — MIFFLIN-ST. JEOR: SCORE: 1504.17

## 2019-03-07 NOTE — ANESTHESIA PREPROCEDURE EVALUATION
Anesthesia Pre-Procedure Evaluation    Patient: Aramis Flores   MRN: [id number] : 1940          Preoperative Diagnosis: LEFT KNEE DJD     Procedure(s):  LEFT TOTAL KNEE ARTHROPLASTY (JACK)^    Past Medical History:   Diagnosis Date     Degenerative joint disease      Diabetes (H)      Hypertension      Rosacea      Past Surgical History:   Procedure Laterality Date     CATARACT IOL, RT/LT         Anesthesia Evaluation     . Pt has had prior anesthetic.     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 : . . . :. . Previous cardiac testing date:results:Stress Testdate: results:No ischemia   date: results: date: results:          METS/Exercise Tolerance:     Hematologic:         Musculoskeletal:   (+) arthritis, , , -       GI/Hepatic:        (-) GERD   Renal/Genitourinary:         Endo:     (+) type II DM Not using insulin .      Psychiatric:         Infectious Disease:         Malignancy:         Other:                          Physical Exam  Normal systems: cardiovascular and pulmonary    Airway   Mallampati: II  TM distance: >3 FB  Neck ROM: full    Dental   (+) missing and caps    Cardiovascular       Pulmonary             No results found for: WBC, HGB, HCT, PLT, CRP, SED, NA, POTASSIUM, CHLORIDE, CO2, BUN, CR, GLC, ABIMBOLA, PHOS, MAG, ALBUMIN, PROTTOTAL, ALT, AST, GGT, ALKPHOS, BILITOTAL, BILIDIRECT, LIPASE, AMYLASE, SHOLA, PTT, INR, FIBR, TSH, T4, T3, HCG, HCGS, CKTOTAL, CKMB, TROPN    Preop Vitals  BP Readings from Last 3 Encounters:   No data found for BP    Pulse Readings from Last 3 Encounters:   No data found for Pulse      Resp Readings from Last 3 Encounters:   No data found for Resp    SpO2 Readings from Last 3 Encounters:   No data found for SpO2      Temp Readings  from Last 1 Encounters:   No data found for Temp    Ht Readings from Last 1 Encounters:   No data found for Ht      Wt Readings from Last 1 Encounters:   No data found for Wt    There is no height or weight on file to calculate BMI.       Anesthesia Plan      History & Physical Review  History and physical reviewed and following examination; no interval change.    ASA Status:  2 .    NPO Status:  > 8 hours    Plan for Spinal and Periph. Nerve Block for postop pain   PONV prophylaxis:  Ondansetron (or other 5HT-3) and Dexamethasone or Solumedrol  Zofran 4mg, avoid decadron given DM    Femoral nerve block for postop pain (via adductor canal)    Spinal       Postoperative Care  Postoperative pain management:  IV analgesics and Multi-modal analgesia.      Consents  Anesthetic plan, risks, benefits and alternatives discussed with:  Patient..                 Glenn Zavala MD

## 2019-03-07 NOTE — ANESTHESIA PROCEDURE NOTES
Peripheral nerve/Neuraxial procedure note : Femoral (via adductor approach)  Pre-Procedure  Performed by Glenn Zavala MD  Location: pre-op      Pre-Anesthestic Checklist: patient identified, IV checked, risks and benefits discussed, informed consent, pre-op evaluation, at physician/surgeon's request and post-op pain management    Timeout  Correct Patient: Yes   Correct Procedure: Yes   Correct Site: Yes   Correct Laterality: Yes   Correct Position: Yes   Site Marked: Yes   .   Procedure Documentation    .    Procedure:  left  Femoral (via adductor approach).  Local skin infiltrated with mL of 1% lidocaine.     Ultrasound used to identify targeted nerve, plexus, or vascular marker and placed a needle adjacent to it., Ultrasound was used to visualize the spread of the anesthetic in close proximity to the above stated nerve. A permanent image is entered into the patient's record.  Patient Prep;chlorhexidine gluconate and isopropyl alcohol.  .  Needle: short bevel Needle Gauge: 21.    Needle Length (Inches) 3.13  Insertion Method: Single Shot.       Assessment/Narrative  Paresthesias: No.  Injection made incrementally with aspirations every 5 mL..  The placement was negative for: blood aspirated, painful injection and site bleeding.  Bolus given via needle..   Secured via.   Complications: none. Comments:  Sterile.  Ultrasound image saved. Ultrasound used to see nerve, needle adjacent, and local deposited around nerves, 20cc 0.5% Ropivacaine

## 2019-03-07 NOTE — BRIEF OP NOTE
[facility]    Brief Operative Note    Pre-operative diagnosis: Left knee OA  Post-operative diagnosis same  Procedure: Procedure(s): LEFT TOTAL KNEE ARTHROPLASTY  Surgeon(s) and Role:     * Mario Boland MD - Primary     * Rosa Oconnell PA-C - Assisting     * Ondina Bowie PA-C - Assisting  Anesthesia: spinal   Estimated blood loss: 100 ml  Drains:  None  Findings:  Advanced OA  Complications: None    Specimens:   ID Type Source Tests Collected by Time Destination   A : bone from left knee Other (specify in comments) Knee, Left SURGICAL PATHOLOGY EXAM Mario Boland MD 3/7/2019 12:58 PM      Plan: DC home POD1 w/family assist.  DVT prophylaxis w/ASA 325mg QD x6wks.

## 2019-03-07 NOTE — ANESTHESIA PROCEDURE NOTES
Peripheral nerve/Neuraxial procedure note : intrathecal  Pre-Procedure  Performed by Glenn Zavala MD  Location: OR      Pre-Anesthestic Checklist: patient identified, IV checked, risks and benefits discussed, informed consent, monitors and equipment checked and pre-op evaluation    Timeout  Correct Patient: Yes   Correct Procedure: Yes       Correct Position: Yes     .   Procedure Documentation    .    Procedure:    Intrathecal.  Insertion Site:L3-4  (midline approach)      Patient Prep;mask, sterile gloves, povidone-iodine 7.5% surgical scrub, patient draped.  .  Needle: Edda-Jens Spinal Needle (gauge): 24  Spinal/LP Needle Length (inches): 3.5 # of attempts: 1 and # of redirects:  Introducer used .       Assessment/Narrative  Paresthesias: No.  .  .  clear CSF fluid removed . Comments:  1% lidocaine to skin  No complications

## 2019-03-07 NOTE — OR NURSING
Patient Aramis Flores is in stable condition and has met criteria for PACU discharge.  JOE Bailey was  informed and a sign out was given for Unit/Station transfer.

## 2019-03-07 NOTE — PROGRESS NOTES
Admission medication history interview status for the 3/7/2019  admission is complete. See EPIC admission navigator for prior to admission medications     Medication history source reliability:Moderate    Medication history interview source(s):Patient    Medication history resources (including written lists, pill bottles, clinic record):None    Primary pharmacy [facility]    Additional medication history information not noted on PTA med list : Called pharmacy to confirm dose on Glipizide -  Pt was unsure    Time spent in this activity: 45 mins    Prior to Admission medications    Medication Sig Last Dose Taking? Auth Provider   aspirin 325 MG tablet Take 325 mg by mouth At Bedtime  2/28/2019 Yes Reported, Patient   atorvastatin (LIPITOR) 40 MG tablet Take 40 mg by mouth daily  3/6/2019 at 1000 Yes Reported, Patient   calcium carbonate 600 mg-vitamin D 400 units (CALTRATE) 600-400 MG-UNIT per tablet Take 1 tablet by mouth daily 2/28/2019 Yes Reported, Patient   fish oil-omega-3 fatty acids 1000 MG capsule Take 1 g by mouth daily 3/6/2019 at 2200 Yes Reported, Patient   glipiZIDE (GLUCOTROL XL) 5 MG 24 hr tablet Take 10 mg by mouth every morning (2 x 5mg = 10mg) 3/6/2019 at 1000 Yes Reported, Patient   metFORMIN (GLUCOPHAGE) 1000 MG tablet Take 1,000 mg by mouth 2 times daily (2 x 500mg = 1000mg) 3/6/2019 at Unknown time Yes Reported, Patient   minocycline (MINOCIN/DYNACIN) 100 MG capsule Take 100 mg by mouth daily 3/6/2019 at 2200 Yes Reported, Patient   multivitamin (CENTRUM SILVER) tablet Take 1 tablet by mouth daily 2/28/2019 Yes Reported, Patient   vitamin D3 (CHOLECALCIFEROL) 2000 units tablet Take 1 tablet by mouth daily 2/28/2019 Yes Reported, Patient   blood glucose monitoring (NO BRAND SPECIFIED) test strip USE TO TEST ONCE TO TWICE DAILY   Reported, Patient   blood glucose monitoring (SOFTCLIX) lancets USE TO TEST ONCE TO TWICE DAILY   Reported, Patient

## 2019-03-07 NOTE — ANESTHESIA CARE TRANSFER NOTE
Patient: Aramis Flores    Procedure(s):  LEFT TOTAL KNEE ARTHROPLASTY    Diagnosis: LEFT KNEE DJD   Diagnosis Additional Information: No value filed.    Anesthesia Type:   Spinal, Periph. Nerve Block for postop pain     Note:  Airway :Room Air  Patient transferred to:PACU  Handoff Report: Identifed the Patient, Identified the Reponsible Provider, Reviewed the pertinent medical history, Discussed the surgical course, Reviewed Intra-OP anesthesia mangement and issues during anesthesia, Set expectations for post-procedure period and Allowed opportunity for questions and acknowledgement of understanding      Vitals: (Last set prior to Anesthesia Care Transfer)    CRNA VITALS  3/7/2019 1428 - 3/7/2019 1503      3/7/2019             Resp Rate (set):  10                Electronically Signed By: ESTEFANY George CRNA  March 7, 2019  3:03 PM

## 2019-03-07 NOTE — ANESTHESIA POSTPROCEDURE EVALUATION
Patient: Aramis Flores    Procedure(s):  LEFT TOTAL KNEE ARTHROPLASTY    Diagnosis:LEFT KNEE DJD   Diagnosis Additional Information: No value filed.    Anesthesia Type:  Spinal, Periph. Nerve Block for postop pain    Note:  Anesthesia Post Evaluation    Patient location during evaluation: PACU  Patient participation: Able to participate in evaluation but full recovery from regional anesthesia has not yet ocurrred but is anticipated to occur within 48 hours  Level of consciousness: awake and alert  Pain management: adequate  Airway patency: patent  Cardiovascular status: acceptable, hemodynamically stable and blood pressure returned to baseline  Respiratory status: acceptable  Hydration status: acceptable  PONV: none     Anesthetic complications: None          Last vitals:  Vitals:    03/07/19 1500 03/07/19 1510 03/07/19 1520   BP: 141/79 127/80 139/87   Pulse: 74 75 72   Resp: 14 13 13   Temp: 36.2  C (97.2  F)     SpO2: 100% 100% 100%         Electronically Signed By: Libia Bailey MD  March 7, 2019  3:26 PM

## 2019-03-08 ENCOUNTER — APPOINTMENT (OUTPATIENT)
Dept: PHYSICAL THERAPY | Facility: CLINIC | Age: 79
End: 2019-03-08
Attending: ORTHOPAEDIC SURGERY
Payer: MEDICARE

## 2019-03-08 VITALS
WEIGHT: 183.1 LBS | TEMPERATURE: 98.8 F | BODY MASS INDEX: 28.74 KG/M2 | RESPIRATION RATE: 16 BRPM | HEART RATE: 64 BPM | DIASTOLIC BLOOD PRESSURE: 66 MMHG | SYSTOLIC BLOOD PRESSURE: 126 MMHG | OXYGEN SATURATION: 96 % | HEIGHT: 67 IN

## 2019-03-08 LAB
ANION GAP SERPL CALCULATED.3IONS-SCNC: 7 MMOL/L (ref 3–14)
BUN SERPL-MCNC: 25 MG/DL (ref 7–30)
CALCIUM SERPL-MCNC: 7.8 MG/DL (ref 8.5–10.1)
CHLORIDE SERPL-SCNC: 109 MMOL/L (ref 94–109)
CO2 SERPL-SCNC: 25 MMOL/L (ref 20–32)
CREAT SERPL-MCNC: 0.97 MG/DL (ref 0.66–1.25)
GFR SERPL CREATININE-BSD FRML MDRD: 74 ML/MIN/{1.73_M2}
GLUCOSE BLDC GLUCOMTR-MCNC: 157 MG/DL (ref 70–99)
GLUCOSE BLDC GLUCOMTR-MCNC: 215 MG/DL (ref 70–99)
GLUCOSE SERPL-MCNC: 130 MG/DL (ref 70–99)
HBA1C MFR BLD: 7.1 % (ref 0–5.6)
HGB BLD-MCNC: 10.7 G/DL (ref 13.3–17.7)
POTASSIUM SERPL-SCNC: 4 MMOL/L (ref 3.4–5.3)
SODIUM SERPL-SCNC: 141 MMOL/L (ref 133–144)

## 2019-03-08 PROCEDURE — 97161 PT EVAL LOW COMPLEX 20 MIN: CPT | Mod: GP

## 2019-03-08 PROCEDURE — 80048 BASIC METABOLIC PNL TOTAL CA: CPT | Performed by: ORTHOPAEDIC SURGERY

## 2019-03-08 PROCEDURE — 83036 HEMOGLOBIN GLYCOSYLATED A1C: CPT | Performed by: ORTHOPAEDIC SURGERY

## 2019-03-08 PROCEDURE — 25000132 ZZH RX MED GY IP 250 OP 250 PS 637: Mod: GY | Performed by: ORTHOPAEDIC SURGERY

## 2019-03-08 PROCEDURE — 85018 HEMOGLOBIN: CPT | Performed by: ORTHOPAEDIC SURGERY

## 2019-03-08 PROCEDURE — 99214 OFFICE O/P EST MOD 30 MIN: CPT | Performed by: INTERNAL MEDICINE

## 2019-03-08 PROCEDURE — A9270 NON-COVERED ITEM OR SERVICE: HCPCS | Mod: GY | Performed by: ORTHOPAEDIC SURGERY

## 2019-03-08 PROCEDURE — 97110 THERAPEUTIC EXERCISES: CPT | Mod: GP

## 2019-03-08 PROCEDURE — 25800030 ZZH RX IP 258 OP 636: Performed by: ORTHOPAEDIC SURGERY

## 2019-03-08 PROCEDURE — 82962 GLUCOSE BLOOD TEST: CPT

## 2019-03-08 PROCEDURE — 97116 GAIT TRAINING THERAPY: CPT | Mod: GP

## 2019-03-08 PROCEDURE — 25000132 ZZH RX MED GY IP 250 OP 250 PS 637: Mod: GY | Performed by: PHYSICIAN ASSISTANT

## 2019-03-08 PROCEDURE — 25000128 H RX IP 250 OP 636: Performed by: ORTHOPAEDIC SURGERY

## 2019-03-08 PROCEDURE — 25000131 ZZH RX MED GY IP 250 OP 636 PS 637: Mod: GY | Performed by: PHYSICIAN ASSISTANT

## 2019-03-08 PROCEDURE — A9270 NON-COVERED ITEM OR SERVICE: HCPCS | Mod: GY | Performed by: PHYSICIAN ASSISTANT

## 2019-03-08 PROCEDURE — 36415 COLL VENOUS BLD VENIPUNCTURE: CPT | Performed by: ORTHOPAEDIC SURGERY

## 2019-03-08 RX ORDER — KETOROLAC TROMETHAMINE 10 MG/1
10 TABLET, FILM COATED ORAL EVERY 6 HOURS
Qty: 6 TABLET | Refills: 0 | Status: SHIPPED | OUTPATIENT
Start: 2019-03-08 | End: 2019-03-10

## 2019-03-08 RX ORDER — OXYCODONE HYDROCHLORIDE 5 MG/1
TABLET ORAL
Qty: 40 TABLET | Refills: 0 | Status: SHIPPED | OUTPATIENT
Start: 2019-03-08 | End: 2020-10-09

## 2019-03-08 RX ORDER — ACETAMINOPHEN 500 MG
1000 TABLET ORAL EVERY 6 HOURS PRN
Qty: 100 TABLET | Refills: 0 | Status: SHIPPED | OUTPATIENT
Start: 2019-03-08 | End: 2020-10-09

## 2019-03-08 RX ORDER — AMOXICILLIN 250 MG
2 CAPSULE ORAL 2 TIMES DAILY PRN
Qty: 60 TABLET | Refills: 0 | Status: SHIPPED | OUTPATIENT
Start: 2019-03-08 | End: 2020-10-09

## 2019-03-08 RX ADMIN — OXYCODONE HYDROCHLORIDE 5 MG: 5 TABLET ORAL at 13:53

## 2019-03-08 RX ADMIN — CEFAZOLIN SODIUM 2 G: 2 INJECTION, SOLUTION INTRAVENOUS at 05:02

## 2019-03-08 RX ADMIN — OXYCODONE HYDROCHLORIDE 5 MG: 5 TABLET ORAL at 10:12

## 2019-03-08 RX ADMIN — KETOROLAC TROMETHAMINE 15 MG: 15 INJECTION, SOLUTION INTRAMUSCULAR; INTRAVENOUS at 13:53

## 2019-03-08 RX ADMIN — SENNOSIDES AND DOCUSATE SODIUM 2 TABLET: 8.6; 5 TABLET ORAL at 07:57

## 2019-03-08 RX ADMIN — INSULIN ASPART 4 UNITS: 100 INJECTION, SOLUTION INTRAVENOUS; SUBCUTANEOUS at 13:05

## 2019-03-08 RX ADMIN — ACETAMINOPHEN 975 MG: 325 TABLET, FILM COATED ORAL at 01:31

## 2019-03-08 RX ADMIN — ACETAMINOPHEN 975 MG: 325 TABLET, FILM COATED ORAL at 10:12

## 2019-03-08 RX ADMIN — KETOROLAC TROMETHAMINE 15 MG: 15 INJECTION, SOLUTION INTRAMUSCULAR; INTRAVENOUS at 07:55

## 2019-03-08 RX ADMIN — ATORVASTATIN CALCIUM 40 MG: 20 TABLET, FILM COATED ORAL at 07:57

## 2019-03-08 RX ADMIN — SODIUM CHLORIDE: 9 INJECTION, SOLUTION INTRAVENOUS at 01:36

## 2019-03-08 RX ADMIN — ASPIRIN 325 MG: 325 TABLET, DELAYED RELEASE ORAL at 07:56

## 2019-03-08 RX ADMIN — KETOROLAC TROMETHAMINE 15 MG: 15 INJECTION, SOLUTION INTRAMUSCULAR; INTRAVENOUS at 01:31

## 2019-03-08 NOTE — PLAN OF CARE
Pt A/O X4. CMS intact, dressing changed, WDL. Baseline cyanotic fingers. BGs 130 and 215. A1 with walker. Voiding in urinal. Managing pain with scheduled toradol and PRN oxycodone. Reviewed AVS with pt and family member, meds given. Pt will discharge after afternoon PT session.

## 2019-03-08 NOTE — PLAN OF CARE
Pt AOx4, came to floor at 1630 from L total knee. VSS on RA. Numbness in feet bilaterally from spinal block. No coverage given, blood sugar below parameters. NS running @ 100 mL, mills in place. Tolerating regular diet.

## 2019-03-08 NOTE — PLAN OF CARE
Physical Therapy Discharge Summary    Reason for therapy discharge:    Discharged to home with outpatient therapy.    Progress towards therapy goal(s). See goals on Care Plan in [facility] electronic health record for goal details.  Goals met    Therapy recommendation(s):     Continued therapy is recommended.  Rationale/Recommendations:  OP PT to progress gait, strength ROM.

## 2019-03-08 NOTE — PROGRESS NOTES
03/08/19 1400   Quick Adds   Type of Visit Initial PT Evaluation   Living Environment   Lives With spouse   Living Arrangements house   Transportation Anticipated family or friend will provide   Living Environment Comment Split-level home, platform step to enter, 7 stairs with unilateral rail to needs.   Self-Care   Usual Activity Tolerance moderate   Current Activity Tolerance moderate   Regular Exercise No   Equipment Currently Used at Home none  (Has SEC and FWW)   Activity/Exercise/Self-Care Comment IND with mobility and ADLs   Functional Level Prior   Ambulation 0-->independent   Transferring 0-->independent   Toileting 0-->independent   Bathing 0-->independent   Fall history within last six months no   Which of the above functional risks had a recent onset or change? ambulation;dressing;transferring;toileting;bathing   Prior Functional Level Comment Has SEC and FWW, did not use   General Information   Onset of Illness/Injury or Date of Surgery - Date 03/07/19   Referring Physician Mario Boland MD   Patient/Family Goals Statement Home today   Pertinent History of Current Problem (include personal factors and/or comorbidities that impact the POC) POD1 L TKA   Precautions/Limitations fall precautions   Weight-Bearing Status - LLE weight-bearing as tolerated   Cognitive Status Examination   Orientation orientation to person, place and time   Level of Consciousness alert   Follows Commands and Answers Questions 100% of the time   Pain Assessment   Patient Currently in Pain Yes, see Vital Sign flowsheet   Integumentary/Edema   Integumentary/Edema Comments Dressing CDI   Posture    Posture Forward head position   Range of Motion (ROM)   ROM Comment L LE limited by pain   Strength   Strength Comments NT   Bed Mobility   Bed Mobility Comments SBA bed mobilty   Transfer Skills   Transfer Comments SBA sit<>stand   Gait   Gait Comments CGA with FWW, step-to gait   Balance   Balance Comments Requires UE support  "  Sensory Examination   Sensory Perception Comments Denies NT   Modality Interventions   Planned Modality Interventions Cryotherapy   General Therapy Interventions   Planned Therapy Interventions bed mobility training;gait training;strengthening;ROM;transfer training;progressive activity/exercise;home program guidelines;risk factor education   Clinical Impression   Criteria for Skilled Therapeutic Intervention yes, treatment indicated   PT Diagnosis Impaired functional mobility   Influenced by the following impairments TKA, pain   Functional limitations due to impairments Transfers, gait, stairs   Clinical Presentation Stable/Uncomplicated   Clinical Presentation Rationale Medically stable   Clinical Decision Making (Complexity) Low complexity   Therapy Frequency` 2 times/day   Predicted Duration of Therapy Intervention (days/wks) 1 day   Anticipated Equipment Needs at Discharge (Has DME)   Anticipated Discharge Disposition Home with Outpatient Therapy   Risk & Benefits of therapy have been explained Yes   Patient, Family & other staff in agreement with plan of care Yes   [facility] \"6 Clicks\"   2016, Trustees of [facility], under license to [employer].  All rights reserved.   6 Clicks Short Forms Basic Mobility Inpatient Short Form   [facility]  \"6 Clicks\" V.2 Basic Mobility Inpatient Short Form   1. Turning from your back to your side while in a flat bed without using bedrails? 3 - A Little   2. Moving from lying on your back to sitting on the side of a flat bed without using bedrails? 3 - A Little   3. Moving to and from a bed to a chair (including a wheelchair)? 3 - A Little   4. Standing up from a chair using your arms (e.g., wheelchair, or bedside chair)? 3 - A Little   5. To walk in hospital room? 3 - A Little   6. Climbing 3-5 steps with a railing? 3 - A Little   Basic Mobility Raw Score (Score out of 24.Lower scores equate to lower levels of function) 18   Total " Evaluation Time   Total Evaluation Time (Minutes) 7

## 2019-03-08 NOTE — DISCHARGE INSTRUCTIONS
Discharge Instructions for Total Knee Replacement  You have undergone knee replacement surgery. The knee joint forms where the thighbone, shinbone, and kneecap meet. The knee joint is supported by muscles and ligaments, and is lined with a cushioning called cartilage. Over time, cartilage wears away. This can make the knee feel stiff and painful. Your doctor replaced your painful joint with an artificial joint to relieve pain and restore movement. Here are some instructions to follow once at home.  Home care    When your doctor says it's OK to shower, carefully wash your incision with soap and water. Rinse the incision well. Then gently pat it dry. Don t rub the incision, or apply creams or lotions. Sit on a shower stool or chair when you shower to keep from falling.    Take pain medicine as directed by your doctor.  Sitting and sleeping    Sit in chairs with arms. The arms make it easier for you to stand up or sit down.    Don t sit for more than 30 to 45 minutes at one time.    Nap if you are tired, but don t stay in bed all day.    Sleep with a pillow under your ankle, not your knee. Be sure to change the position of your leg during the night.  Moving safely    The key to successful recovery is movement with walking and exercising your knee as directed by your doctor. You should be able to start moving your leg shortly after surgery as directed by your doctor.      Walk up and down stairs with support. Try one step at a time. Use the railing if possible.    Don t drive until your doctor says it s OK. Most people can start driving about 6 weeks after surgery. Don t drive while you are taking opioid pain medicine.  Other precautions    Don't soak your knee in water until your doctor says it s OK. This means no hot tubs, bathtubs, or swimming pools.    Wear the support stockings you were given in the hospital, as instructed by your doctor. You may wear these stockings for 4 to 6 weeks after surgery. If needed, you  can place a bandage over the incision to prevent irritation from clothing or support stockings.    Arrange your household to keep the items you need handy. Keep everything else out of the way. Remove items that may cause you to fall, such as throw rugs and electrical cords.    Use nonslip bath mats, grab bars, an elevated toilet seat, and a shower chair in your bathroom.    Until your balance, flexibility, and strength improve, use a cane, crutches, a walker, handrails, or someone to help you.    Keep your hands free by using a backpack, wyatt pack, apron, or pockets to carry things.    Prevent infection. Ask your doctor for instructions if you haven t already received them. Any infection will need to be treated immediately. Call your doctor right away if you think you might have an infection.    Tell your dentist that you have an artificial joint and take antibiotics as prescribed before any dental work.    Tell all your healthcare providers about your artificial joint before any medical procedure.    Maintain a healthy weight. Get help to lose any extra pounds. Added body weight puts stress on the knee.    Take any medicine you may have been given after surgery. This may include blood-thinning medicine to prevent blood clots or antibiotics to prevent infection.  Follow-up care  Follow up with your healthcare provider, or as advised. You will need to have your staples removed 2 to 3 weeks after surgery.  Call 911  Call 911 right away if you have:    Chest pain    Shortness of breath    Any pain or tenderness in your calf  When to call your healthcare provider  Call your healthcare provider right away if you have:    Fever of 100.4 F (38 C) or higher, or as directed by your healthcare provider    Shaking chills    Stiffness, or inability to move the knee    Increased swelling in your leg    Increased redness, tenderness, or swelling in or around the knee incision    Drainage from the knee incision    Increased knee  pain   Date Last Reviewed: 7/1/2016 2000-2017 [employer]. [address] [place] [address], [place] [address]. All rights reserved. This information is not intended as a substitute for professional medical care. Always follow your healthcare professional's instructions.

## 2019-03-08 NOTE — PLAN OF CARE
A&Ox4. CMS intact. VSS on RA. Dressing CDI. Declined any pain med, except scheduled tylenol. Ambulate from bed to door with A2 and walker this shift. Matt discontinued this am- DTV. Continue to monitor.

## 2019-03-08 NOTE — PROGRESS NOTES
"Orthopedic Surgery  3/8/2019  POD #1    S: Patient voices no complaints today.     O: Blood pressure 155/69, pulse 64, temperature 98.5  F (36.9  C), resp. rate 16, height 1.702 m (5' 7\"), weight 83.1 kg (183 lb 1.6 oz), SpO2 98 %.  Lab Results   Component Value Date    HGB 10.7 03/08/2019     Neurovascularly intact.  Calves are negative bilaterally, both soft and nontender.  The dressing is C/D/I.    A: Mr. Flores is doing well status post left total knee arthroplasty.    P:   1. Dressing change prior to d/c, use ABD and tape dressing.  No aquacel or island dressing.  2. Mobilize and continue physical therapy.   3. Discharge to home today.    Ondina Bowie PA-C  [phone]  "

## 2019-03-08 NOTE — PROGRESS NOTES
[facility]    Hospitalist Progress Note    Date of Service (when I saw the patient): 03/08/2019    Assessment & Plan   Aramis Flores is a 78 year old male who was admitted on 3/7/2019 for an elective left total knee arthroplasty.    1.  Left total knee arthroplasty.  Postoperative management per orthopedic surgery.    2.  Diabetes mellitus type 2.  Glipizide and metformin are on hold.  Continue insulin sliding scale.    3.  Hypertension.  Blood pressure currently within normal range without medication.    4.  Hyperlipidemia.  Continue atorvastatin.    Disposition: Expected discharge in 1-2 days.    DVT Prophylaxis: Defer to primary service  Code Status: Full Code    Dani Willy Garrick  Text Page (7 am to 6 pm)    Interval History   The patient is currently resting in bed.  His pain is well controlled.  He has no acute complaints.    -Data reviewed today: I reviewed all new labs and imaging results over the last 24 hours. I personally reviewed no images or EKG's today.    Physical Exam   Temp: 98.8  F (37.1  C) Temp src: Oral BP: 126/66 Pulse: 64 Heart Rate: 86 Resp: 16 SpO2: 96 % O2 Device: None (Room air)    Vitals:    03/07/19 1011   Weight: 83.1 kg (183 lb 1.6 oz)     Vital Signs with Ranges  Temp:  [96.9  F (36.1  C)-98.8  F (37.1  C)] 98.8  F (37.1  C)  Pulse:  [61-75] 64  Heart Rate:  [59-86] 86  Resp:  [11-25] 16  BP: ()/(51-91) 126/66  SpO2:  [96 %-100 %] 96 %  I/O last 3 completed shifts:  In: 2650 [P.O.:600; I.V.:2050]  Out: 1950 [Urine:1950]    Gen: Well nourished, well developed, alert and oriented x 3, no acute distressed  HEENT: Atraumatic, normocephalic  Lungs: Clear to ausculation without wheezes, rhonchi, or rales  Heart: Regular rate and rhythm, no gallops or rubs, no murmurs  GI: Bowel sound normal, no hepatosplenomegaly or masses  Lymph: No lymphadenopathy or edema  Skin: No rashes     Medications     sodium chloride 100 mL/hr at 03/08/19 0136       acetaminophen  975  mg Oral Q8H     atorvastatin  40 mg Oral Daily     insulin aspart  1-10 Units Subcutaneous TID AC     insulin aspart  1-7 Units Subcutaneous At Bedtime     ketorolac  15 mg Intravenous Q6H     senna-docusate  1 tablet Oral BID    Or     senna-docusate  2 tablet Oral BID     sodium chloride (PF)  3 mL Intracatheter Q8H       Data   Recent Labs   Lab 03/08/19  0623 03/07/19  1021   HGB 10.7* 13.1*     --    POTASSIUM 4.0 4.2   CHLORIDE 109  --    CO2 25  --    BUN 25  --    CR 0.97 1.12   ANIONGAP 7  --    ABIMBOLA 7.8*  --    * 150*       Recent Results (from the past 24 hour(s))   XR Knee Port Left 1/2 Views    Narrative    PORTABLE TWO VIEWS OF THE LEFT KNEE  3/7/2019 3:35 PM     HISTORY: Postoperative evaluation of the left knee.    COMPARISON: None.    FINDINGS: There are surgical changes of a left total knee  arthroplasty. The hardware is intact with no fracture or other  complication seen. No other abnormality is demonstrated.      Impression    IMPRESSION: Unremarkable postoperative appearance of the left knee.    LISETTE JENKINS MD

## 2019-03-08 NOTE — PLAN OF CARE
Discharge Planner PT     PT: Orders received, eval completed, tx initiated. POD1 L TKA  Pt lives in a split-level home with his spouse. At baseline ambulates IND    Patient plan for discharge: Home today  Current status: Transfers: supine<>sit SBA, sit<>stand SBA. Pt ambulates 550' CGA progressing to SBA with FWW; cues to increase knee flexion with gait to prevent R lean, particuarily at toe off. Intially step-to gait, progressing to step-through with comfort and cues.  Tolerating TKA exercises well.  L knee 5-105 deg AAROM  Barriers to return to prior living situation: None anticipated   Recommendations for discharge: Home with OP PT  Rationale for recommendations: Anticipate patient will progress well given PLOF and current functional status. Will benefit from ongoing skilled PT intervention to improve mobility status prior to returning home.       Entered by: Jl Casanova 03/08/2019 2:30 PM

## 2019-03-08 NOTE — CONSULTS
Consult Date:  03/07/2019      HOSPITALIST CONSULTATION      PRIMARY CARE PROVIDER:  Yared Velásquez MD       REASON FOR CONSULTATION:  Co-medical management.      PHYSICIAN REQUESTING CONSULTATION:  Dr. Soto.      HISTORY OF PRESENT ILLNESS:  Aramis Flores is a 78-year-old male with past medical history of diabetes mellitus type 2, hypertension and osteoarthritis of the left knee who underwent a previously scheduled left total knee arthroplasty this afternoon with Dr. Soto.  Surgery was performed using spinal anesthesia.  EBL was 100.  Surgery was performed without complication.  Postoperatively, hospitalist was asked to consult for medical co-management.      The patient presently is evaluated in hospital room with wife at bedside.  He reports that he is doing well after surgery.  His knee has a dull ache, otherwise, he is feeling well.  He denies any significant shortness of breath, difficulty breathing, chest pain or chest discomfort.  No acute concerns from patient or wife.      PAST MEDICAL HISTORY:   1.  Diabetes mellitus type 2.  Last hemoglobin A1c available in the system was 7.4% in 06/2017.   2.  Hypertension.   3.  Hyperlipidemia.   4.  Osteoarthritis of the left knee.      PAST SURGICAL HISTORY:   1.  Hernia repair.   2.  Cataract removal, bilaterally.      PRIOR TO ADMISSION MEDICATIONS:    Prior to Admission medications    Medication Sig Last Dose Taking? Auth Provider   acetaminophen (TYLENOL) 500 MG tablet Take 2 tablets (1,000 mg) by mouth every 6 hours as needed for pain  Yes Ondina Bowie PA-C   aspirin (ASA) 325 MG EC tablet Take 1 tablet (325 mg) by mouth daily  Yes Ondina Bowie PA-C   atorvastatin (LIPITOR) 40 MG tablet Take 40 mg by mouth daily  3/6/2019 at 1000 Yes Reported, Patient   calcium carbonate 600 mg-vitamin D 400 units (CALTRATE) 600-400 MG-UNIT per tablet Take 1 tablet by mouth daily 2/28/2019 Yes Reported, Patient   fish oil-omega-3 fatty  acids 1000 MG capsule Take 1 g by mouth daily 3/6/2019 at 2200 Yes Reported, Patient   glipiZIDE (GLUCOTROL XL) 5 MG 24 hr tablet Take 10 mg by mouth every morning (2 x 5mg = 10mg) 3/6/2019 at 1000 Yes Reported, Patient   ketorolac (TORADOL) 10 MG tablet Take 1 tablet (10 mg) by mouth every 6 hours for 2 days Take until gone.  Yes Ondina Bowie PA-C   metFORMIN (GLUCOPHAGE) 1000 MG tablet Take 1,000 mg by mouth 2 times daily (2 x 500mg = 1000mg) 3/6/2019 at Unknown time Yes Reported, Patient   minocycline (MINOCIN/DYNACIN) 100 MG capsule Take 100 mg by mouth daily 3/6/2019 at 2200 Yes Reported, Patient   multivitamin (CENTRUM SILVER) tablet Take 1 tablet by mouth daily 2/28/2019 Yes Reported, Patient   oxyCODONE (ROXICODONE) 5 MG tablet Take 1 tablet every 4-6 hours for pain rating 3-6,  Take 2 tablets every 4-6 hours for pain 7-10    **Max 6 tablets per day**  Yes Ondina Bowie PA-C   senna-docusate (SENOKOT-S/PERICOLACE) 8.6-50 MG tablet Take 2 tablets by mouth 2 times daily as needed for constipation  Yes Ondina Bowie PA-C   vitamin D3 (CHOLECALCIFEROL) 2000 units tablet Take 1 tablet by mouth daily 2/28/2019 Yes Reported, Patient   blood glucose monitoring (NO BRAND SPECIFIED) test strip USE TO TEST ONCE TO TWICE DAILY   Reported, Patient   blood glucose monitoring (SOFTCLIX) lancets USE TO TEST ONCE TO TWICE DAILY   Reported, Patient       ALLERGIES:  DOXYCYCLINE CAUSING SWELLING.      FAMILY HISTORY:  Reviewed and is noncontributory to present admission.      SOCIAL HISTORY:  The patient presently lives with wife in [place].  He is a nonsmoker, nondrinker.      REVIEW OF SYSTEMS:  A 10-point review of systems was performed and is otherwise negative.  Please refer to the HPI.      PHYSICAL EXAMINATION:   VITAL SIGNS:  Temperature of 96.9, heart rate of 82, respiratory rate of 15, blood pressure of 159/72, SpO2 of 98% on room air.   GENERAL:  This is a well-developed,  well-nourished male who appears comfortable.   HEENT:  Head is normocephalic.  Pupils are equal and react to light bilaterally.  EOMs are intact bilaterally.  Nose, mouth are patent.  Mucous membranes are moist.   LUNGS:  Clear to auscultation bilaterally without wheeze or crackles.   CARDIOVASCULAR:  Heart is regular rate and rhythm, normal S1 and S2, no murmur.   ABDOMEN:  Soft, nontender, nondistended, positive bowel sounds appreciated diffusely.   EXTREMITIES:  Left lower extremity is with an Ace bandage in place to knee.  T physical therapy does have range of motion at the level of ankle and toes.  Moving remaining extremities spontaneously without difficulty.   SKIN:  Warm and dry, no rash.      LABS AND IMAGING:  Potassium 4.2, creatinine of 1.12, hemoglobin of 13.1, glucose of 150.      ASSESSMENT AND PLAN:  Aramis Flores is a 78-year-old male with past medical history of diabetes mellitus type 2, hypertension, hyperlipidemia and osteoarthritis of the knee, who underwent a previously scheduled left total knee arthroplasty this afternoon per Dr. Soto.  Surgery was performed using spinal anesthesia.  Postoperatively, the Hospitalist Service was asked to consult for co-medical management.   1.  Osteoarthritis of the knee, status post left total knee arthroplasty.  The patient is initiated on full dose aspirin postoperatively for deep venous thrombosis prophylaxis.  Per notes, anticipate discharge on postop day #1 to home.  Further postoperative management and monitoring per primary service.   2.  Diabetes mellitus type 2.  Last hemoglobin A1c within our system was 7.2 in 06/2017.  He is maintained on glipizide as well as metformin, both of which were held this morning prior to surgery.  At this time we will hold oral agents and we will place on high insulin sliding scale with a recheck of hemoglobin A1c tomorrow morning.   3.  History of hypertension.  The patient is not recorded to be on medications  during this encounter; however, on review of preoperative H and P he has losartan/hydrochlorothiazide 100/12.5 mg daily on current medication list.  I do note that his creatinine is slightly elevated on his admission laboratory studies.  We will repeat a BMP in the morning.  Should blood pressures be elevated, would recommend resuming this medication.   4.  Hyperlipidemia.  The patient again does not have medications listed this encounter; however, on preoperative H and P has atorvastatin 40 mg daily listed as a current medication.  This can be resumed at discharge.   5.  Deep venous thrombosis prophylaxis.  Full dose aspirin as noted above per primary service in addition to ROSSY hose as well as PCDs.      This patient was staffed with Dr. Mallorie Canales who independently interviewed and evaluated the patient and is in agreement with above-mentioned plan.         GLENROY CANALES MD       As dictated by ART SANTACRUZ PA-C            D: 2019   T: 2019   MT: FRANCK      Name:     GAYATHRI LIGHT   MRN:      [id number]        Account:       [id number]   :      1940           Consult Date:  2019      Document: [id number]       cc: Mario Velásquez MD

## 2019-03-09 NOTE — OP NOTE
Operative Note    Aramis Flores MRN# [id number]   YOB: 1940 Age: 78 year old   Date of Procedure: 3/8/2019    1st Assistant: SAMARA Clarke PA-C    PREOPERATIVE DIAGNOSIS: Left knee osteoarthritis, failure to respond to conservative management.     POSTOPERATIVE DIAGNOSIS: Left knee osteoarthritis, failure to respond to conservative management.     PROCEDURE: Left total knee arthroplasty, [place] Triathalon components, posterior stabilized.  Tourniquet-less technique.     DESCRIPTION OF PROCEDURE: Aramis Flores was brought to the operating room. After satisfactory anesthesia, the left lower extremity was prepped and draped in the usual sterile fashion. The patient received 1 gram of Ancef preoperatively.     Straight anterior incision was made. Dissection was carried down to the extensor mechanism. Medial arthrotomy was made. Patella was exposed, measured and resected to accept a size 38mm, asymmetric patella. Dark staining was noted of the articular surfaces.  Specimen sent to pathology.  The knee was then flexed up. Intramedullary guide was placed at 5 degrees as per the preoperative plan. The distal femoral cut was made followed by anteroposterior cuts and chamfer cuts. Box cut was made for the femoral component as well. Femur sized to be a size 4. Attention was then directed to the tibia. Tibial cut was made perpendicular to the long axis of the tibia in both AP and lateral planes. The tibia sized to be a size 4. Soft tissue balancing was performed. Trial reduction was performed and with a 11-mm PS insert, there was excellent soft tissue balancing, patellar tracking, alignment as well as motion. Trial components were removed. Tibial baseplate was prepared for size 4 baseplate. All 3 components were cemented in place without difficulty. Excess cement was removed. The real 11-mm PS insert was impacted in place and then the wound was closed in sequential layers  including interrupted 0 Vicryl as well as a running 0 Stratafix suture in the extensor mechanism, interrupted 2-0 Vicryl, running 2-0 Stratafix, and 3-0 subcuticular monocryl. Dermabond Prino dressing was applied. Sterile dressing was applied. The patient left the operating room in satisfactory condition. Patient received 1 gm of tranexamic acid pre-op and at closure, a 3 minute dilute betadine soak was performed prior to closure, and one gram of vancomycin powder was placed deep and superficial prior to closure.    HAJA DEL TORO MD

## 2019-03-11 LAB — COPATH REPORT: NORMAL

## 2019-03-13 NOTE — PLAN OF CARE
[facility]      OUTPATIENT PHYSICAL THERAPY EVALUATION  PLAN OF TREATMENT FOR OUTPATIENT REHABILITATION  (COMPLETE FOR INITIAL CLAIMS ONLY)  Patient's Last Name, First Name, M.I.  YOB: 1940  Aramis Flores                        Provider's Name  [facility] Medical Record No.  [id number]                               Onset Date:  03/07/19   Start of Care Date:      3/8/19   Type:     _X_PT   ___OT   ___SLP Medical Diagnosis:                        Impaired gait   PT Diagnosis:  Impaired functional mobility   Visits from SOC:  1   _________________________________________________________________________________  Plan of Treatment/Functional Goals    Planned Interventions: Cryotherapy,    bed mobility training, gait training, strengthening, ROM, transfer training, progressive activity/exercise, home program guidelines, risk factor education,       Goals: See Physical Therapy Goals on Care Plan in [facility] electronic health record.    Therapy Frequency: 2 times/day  Predicted Duration of Therapy Intervention: 1 day  _________________________________________________________________________________    I CERTIFY THE NEED FOR THESE SERVICES FURNISHED UNDER        THIS PLAN OF TREATMENT AND WHILE UNDER MY CARE     (Physician co-signature of this document indicates review and certification of the therapy plan).                 ,      Referring Physician: Mario Boland MD            Initial Assessment        See Physical Therapy evaluation dated   in Epic electronic health record.

## 2019-08-19 ENCOUNTER — OFFICE VISIT (OUTPATIENT)
Dept: OPTOMETRY | Facility: CLINIC | Age: 79
End: 2019-08-19
Payer: MEDICARE

## 2019-08-19 DIAGNOSIS — H52.03 HYPEROPIA OF BOTH EYES WITH ASTIGMATISM: Primary | ICD-10-CM

## 2019-08-19 DIAGNOSIS — E11.9 DIABETES MELLITUS WITHOUT OPHTHALMIC MANIFESTATIONS (H): ICD-10-CM

## 2019-08-19 DIAGNOSIS — Z96.1 PSEUDOPHAKIA OF BOTH EYES: ICD-10-CM

## 2019-08-19 DIAGNOSIS — H02.889 MEIBOMIAN GLAND DYSFUNCTION: ICD-10-CM

## 2019-08-19 DIAGNOSIS — H52.203 HYPEROPIA OF BOTH EYES WITH ASTIGMATISM: Primary | ICD-10-CM

## 2019-08-19 PROCEDURE — 92014 COMPRE OPH EXAM EST PT 1/>: CPT | Performed by: OPTOMETRIST

## 2019-08-19 PROCEDURE — 92015 DETERMINE REFRACTIVE STATE: CPT | Mod: GY | Performed by: OPTOMETRIST

## 2019-08-19 ASSESSMENT — CONF VISUAL FIELD
METHOD: COUNTING FINGERS
OS_NORMAL: 1
OD_NORMAL: 1

## 2019-08-19 ASSESSMENT — REFRACTION_WEARINGRX
OD_ADD: +2.50
OS_SPHERE: -3.50
OS_AXIS: 156
OD_AXIS: 022
OD_SPHERE: -1.25
OS_CYLINDER: +4.00
OS_ADD: +2.50
SPECS_TYPE: BIFOCAL
OD_CYLINDER: +3.00

## 2019-08-19 ASSESSMENT — REFRACTION_MANIFEST
OS_AXIS: 155
OS_CYLINDER: +2.75
OD_CYLINDER: +2.75
OS_CYLINDER: +3.25
OS_AXIS: 171
OS_SPHERE: -0.75
OS_SPHERE: -1.75
OD_AXIS: 032
OD_AXIS: 023
OD_SPHERE: -0.25
OD_SPHERE: PLANO
OD_CYLINDER: +2.00
METHOD_AUTOREFRACTION: 1

## 2019-08-19 ASSESSMENT — CUP TO DISC RATIO
OD_RATIO: 0.3
OS_RATIO: 0.2

## 2019-08-19 ASSESSMENT — EXTERNAL EXAM - LEFT EYE: OS_EXAM: NORMAL

## 2019-08-19 ASSESSMENT — SLIT LAMP EXAM - LIDS
COMMENTS: MEIBOMIAN GLAND DYSFUNCTION
COMMENTS: MEIBOMIAN GLAND DYSFUNCTION

## 2019-08-19 ASSESSMENT — TONOMETRY
OS_IOP_MMHG: 16
IOP_METHOD: APPLANATION
OD_IOP_MMHG: 16

## 2019-08-19 ASSESSMENT — EXTERNAL EXAM - RIGHT EYE: OD_EXAM: NORMAL

## 2019-08-19 ASSESSMENT — VISUAL ACUITY
OD_CC: 20/60
OS_CC: 20/40
METHOD: SNELLEN - LINEAR

## 2019-08-19 ASSESSMENT — REFRACTION: OD_SPHERE: SAME

## 2019-08-19 NOTE — LETTER
8/19/2019         RE: Aramis Flores  [address] [facility] [address]        Dear Colleague,    Thank you for referring your patient, Aramis Flores, to the [facility]. Please see a copy of my visit note below.    Chief Complaint   Patient presents with     Annual Eye Exam        Lab Results   Component Value Date    A1C 7.1 03/08/2019        Last Eye Exam: 8/18  Dilated Previously: every year     What are you currently using to see?  glasses       Distance Vision Acuity: Satisfied with vision    Near Vision Acuity: trouble with small print    Eye Comfort: good denies dryness  Do you use eye drops? : No    OD: +1.75 - 2.25 X 111 VA: 20/40  OS: +0.50 - 3.00 X 064 VA: 20/40+2  ADD: + 2.50        Medical, surgical and family histories reviewed and updated 8/19/2019.     History of refractive amblyopia     OBJECTIVE: See Ophthalmology exam    ASSESSMENT:    ICD-10-CM    1. Hyperopia of both eyes with astigmatism H52.03 EYE EXAM (SIMPLE-NONBILLABLE)    H52.203 REFRACTION   2. Pseudophakia of both eyes Z96.1    3. Meibomian gland dysfunction H02.889    4. 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        Sincerely,        Nellie Durbin, OD

## 2019-08-19 NOTE — PROGRESS NOTES
Chief Complaint   Patient presents with     Annual Eye Exam        Lab Results   Component Value Date    A1C 7.1 03/08/2019        Last Eye Exam: 8/18  Dilated Previously: every year     What are you currently using to see?  glasses       Distance Vision Acuity: Satisfied with vision    Near Vision Acuity: trouble with small print    Eye Comfort: good denies dryness  Do you use eye drops? : No    OD: +1.75 - 2.25 X 111 VA: 20/40  OS: +0.50 - 3.00 X 064 VA: 20/40+2  ADD: + 2.50        Medical, surgical and family histories reviewed and updated 8/19/2019.     History of refractive amblyopia     OBJECTIVE: See Ophthalmology exam    ASSESSMENT:    ICD-10-CM    1. Hyperopia of both eyes with astigmatism H52.03 EYE EXAM (SIMPLE-NONBILLABLE)    H52.203 REFRACTION   2. Pseudophakia of both eyes Z96.1    3. Meibomian gland dysfunction H02.889    4. 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

## 2019-08-19 NOTE — PATIENT INSTRUCTIONS
Patient Education   Diabetes weakens the blood vessels all over the body, including the eyes. Damage to the blood vessels in the eyes can cause swelling or bleeding into part of the eye (called the retina). This is called diabetic retinopathy (MARCELA-tin--pu-thee). If not treated, this disease can cause vision loss or blindness.   Symptoms may include blurred or distorted vision, but many people have no symptoms. It's important to see your eye doctor regularly to check for problems.   Early treatment and good control can help protect your vision. Here are the things you can do to help prevent vision loss:      1. Keep your blood sugar levels under tight control.      2. Bring high blood pressure under control.      3. No smoking.      4. Have yearly dilated eye exams.

## 2020-08-24 ENCOUNTER — OFFICE VISIT (OUTPATIENT)
Dept: OPTOMETRY | Facility: CLINIC | Age: 80
End: 2020-08-24
Payer: MEDICARE

## 2020-08-24 DIAGNOSIS — H52.203 HYPEROPIA OF BOTH EYES WITH ASTIGMATISM: Primary | ICD-10-CM

## 2020-08-24 DIAGNOSIS — H52.03 HYPEROPIA OF BOTH EYES WITH ASTIGMATISM: Primary | ICD-10-CM

## 2020-08-24 DIAGNOSIS — H01.00B BLEPHARITIS OF UPPER AND LOWER EYELIDS OF BOTH EYES, UNSPECIFIED TYPE: ICD-10-CM

## 2020-08-24 DIAGNOSIS — H01.00A BLEPHARITIS OF UPPER AND LOWER EYELIDS OF BOTH EYES, UNSPECIFIED TYPE: ICD-10-CM

## 2020-08-24 DIAGNOSIS — E11.9 DIABETES MELLITUS WITHOUT OPHTHALMIC MANIFESTATIONS (H): ICD-10-CM

## 2020-08-24 DIAGNOSIS — Z96.1 PSEUDOPHAKIA OF BOTH EYES: ICD-10-CM

## 2020-08-24 DIAGNOSIS — H52.4 PRESBYOPIA: ICD-10-CM

## 2020-08-24 PROCEDURE — 92014 COMPRE OPH EXAM EST PT 1/>: CPT | Performed by: OPTOMETRIST

## 2020-08-24 PROCEDURE — 92015 DETERMINE REFRACTIVE STATE: CPT | Mod: GY | Performed by: OPTOMETRIST

## 2020-08-24 ASSESSMENT — VISUAL ACUITY
OD_SC: 20/70
CORRECTION_TYPE: GLASSES
OD_CC+: +1
OS_CC: 20/60
OS_SC: 20/70
OD_CC: 20/80
OD_CC: 20/70
OS_CC+: +2
METHOD: SNELLEN - LINEAR
OD_SC+: -2
OS_CC: 20/60
OS_SC+: -2

## 2020-08-24 ASSESSMENT — REFRACTION_MANIFEST
OS_ADD: +2.50
OD_SPHERE: -0.25
OD_ADD: +2.50
OS_SPHERE: -1.50
OD_CYLINDER: +1.75
OD_AXIS: 030
OS_SPHERE: -1.50
OS_CYLINDER: +4.00
METHOD_AUTOREFRACTION: 1
OS_AXIS: 168
OS_AXIS: 155
OS_CYLINDER: +3.25

## 2020-08-24 ASSESSMENT — CONF VISUAL FIELD
METHOD: COUNTING FINGERS
OD_NORMAL: 1
OS_NORMAL: 1

## 2020-08-24 ASSESSMENT — TONOMETRY
OS_IOP_MMHG: 15
IOP_METHOD: APPLANATION
OD_IOP_MMHG: 13

## 2020-08-24 ASSESSMENT — REFRACTION_WEARINGRX
OS_ADD: +2.50
OS_AXIS: 156
OD_SPHERE: -1.25
OD_AXIS: 022
OD_ADD: +2.50
OS_SPHERE: -3.50
SPECS_TYPE: BIFOCAL
OS_CYLINDER: +4.00
OD_CYLINDER: +3.00

## 2020-08-24 ASSESSMENT — EXTERNAL EXAM - LEFT EYE: OS_EXAM: NORMAL

## 2020-08-24 ASSESSMENT — CUP TO DISC RATIO
OD_RATIO: 0.3
OS_RATIO: 0.2

## 2020-08-24 ASSESSMENT — EXTERNAL EXAM - RIGHT EYE: OD_EXAM: NORMAL

## 2020-08-24 NOTE — LETTER
"    8/24/2020         RE: Aramis Flores  [address] [facility] [address]        Dear Colleague,    Thank you for referring your patient, Aramis Flores, to the [facility]. Please see a copy of my visit note below.    Chief Complaint   Patient presents with     Diabetic Eye Exam        Lab Results   Component Value Date    A1C 7.1 03/08/2019     Pt states that he had his A1C last checked at [facility] \"not too long ago\"  SHON: 2019  Blur at near  No other concerns at this time.     Last Eye Exam: 2019  Dilated Previously: Yes    What are you currently using to see?  glasses    Distance Vision Acuity: Satisfied with vision    Near Vision Acuity: Not satisfied     Eye Comfort: good  Do you use eye drops? : Yes: very rarely  Occupation or Hobbies: retired    Soheila Otero CPO     Medical, surgical and family histories reviewed and updated 8/24/2020.     History of refractive amblyopia    OBJECTIVE: See Ophthalmology exam  Final Rx      Sphere  Cylinder  Axis  Dist VA  Add    Right  [place]  +2.00  023  20/40  +2.50    Left  -1.75  +2.75  155  20/30  +2.50    Expiration Date: 8/19/2021        ASSESSMENT:    ICD-10-CM    1. Hyperopia of both eyes with astigmatism  H52.03 REFRACTION    H52.203 EYE EXAM (SIMPLE-NONBILLABLE)   2. Presbyopia  H52.4    3. Pseudophakia of both eyes  Z96.1    4. Diabetes mellitus without ophthalmic manifestations (H)  E11.9 REFRACTION     EYE EXAM (SIMPLE-NONBILLABLE)   5. Blepharitis of upper and lower eyelids of both eyes, unspecified type  H01.00A     H01.00B       PLAN:    Aramis Flores aware  eye exam results will be sent to No Ref-Primary, Physician.    Nellie Durbin OD     Again, thank you for allowing me to participate in the care of your patient.        Sincerely,        Nellie Durbin, OD    "

## 2020-08-24 NOTE — PATIENT INSTRUCTIONS
You should replace your lenses, your prescription is off and you are seeing 20/60-20/70 and could be seeing 20/40    Patient Education   Diabetes weakens the blood vessels all over the body, including the eyes. Damage to the blood vessels in the eyes can cause swelling or bleeding into part of the eye (called the retina). This is called diabetic retinopathy (MARCELA-tin-AH-puh-thee). If not treated, this disease can cause vision loss or blindness.   Symptoms may include blurred or distorted vision, but many people have no symptoms. It's important to see your eye doctor regularly to check for problems.   Early treatment and good control can help protect your vision. Here are the things you can do to help prevent vision loss:      1. Keep your blood sugar levels under tight control.      2. Bring high blood pressure under control.      3. No smoking.      4. Have yearly dilated eye exams.

## 2020-08-24 NOTE — PROGRESS NOTES
"Chief Complaint   Patient presents with     Diabetic Eye Exam        Lab Results   Component Value Date    A1C 7.1 03/08/2019     Pt states that he had his A1C last checked at [facility] \"not too long ago\"  SHON: 2019  Blur at near  No other concerns at this time.     Last Eye Exam: 2019  Dilated Previously: Yes    What are you currently using to see?  glasses    Distance Vision Acuity: Satisfied with vision    Near Vision Acuity: Not satisfied     Eye Comfort: good  Do you use eye drops? : Yes: very rarely  Occupation or Hobbies: retired    Soheila Otero CPO     Medical, surgical and family histories reviewed and updated 8/24/2020.     History of refractive amblyopia    OBJECTIVE: See Ophthalmology exam  Final Rx      Sphere  Cylinder  Axis  Dist VA  Add    Right  [place]  +2.00  023  20/40  +2.50    Left  -1.75  +2.75  155  20/30  +2.50    Expiration Date: 8/19/2021        ASSESSMENT:    ICD-10-CM    1. Hyperopia of both eyes with astigmatism  H52.03 REFRACTION    H52.203 EYE EXAM (SIMPLE-NONBILLABLE)   2. Presbyopia  H52.4    3. Pseudophakia of both eyes  Z96.1    4. Diabetes mellitus without ophthalmic manifestations (H)  E11.9 REFRACTION     EYE EXAM (SIMPLE-NONBILLABLE)   5. Blepharitis of upper and lower eyelids of both eyes, unspecified type  H01.00A     H01.00B       PLAN:    Aramis Flores aware  eye exam results will be sent to No Ref-Primary, Physician.    Nellie Durbin OD   "

## 2020-10-09 ENCOUNTER — OFFICE VISIT (OUTPATIENT)
Dept: FAMILY MEDICINE | Facility: CLINIC | Age: 80
End: 2020-10-09

## 2020-10-09 VITALS
WEIGHT: 187 LBS | OXYGEN SATURATION: 98 % | HEART RATE: 85 BPM | BODY MASS INDEX: 29.35 KG/M2 | DIASTOLIC BLOOD PRESSURE: 68 MMHG | TEMPERATURE: 98.5 F | HEIGHT: 67 IN | SYSTOLIC BLOOD PRESSURE: 130 MMHG

## 2020-10-09 DIAGNOSIS — M79.661 PAIN OF RIGHT LOWER LEG: ICD-10-CM

## 2020-10-09 DIAGNOSIS — Z76.89 HEALTH CARE HOME: ICD-10-CM

## 2020-10-09 DIAGNOSIS — M25.571 PAIN IN JOINT, ANKLE AND FOOT, RIGHT: Primary | ICD-10-CM

## 2020-10-09 DIAGNOSIS — R26.81 GAIT INSTABILITY: ICD-10-CM

## 2020-10-09 DIAGNOSIS — Z71.89 ACP (ADVANCE CARE PLANNING): ICD-10-CM

## 2020-10-09 PROCEDURE — 99202 OFFICE O/P NEW SF 15 MIN: CPT | Performed by: FAMILY MEDICINE

## 2020-10-09 PROCEDURE — 73590 X-RAY EXAM OF LOWER LEG: CPT | Mod: RT | Performed by: FAMILY MEDICINE

## 2020-10-09 PROCEDURE — 73610 X-RAY EXAM OF ANKLE: CPT | Mod: RT | Performed by: FAMILY MEDICINE

## 2020-10-09 RX ORDER — FLUOCINONIDE 0.5 MG/G
CREAM TOPICAL
COMMUNITY
Start: 2020-09-30 | End: 2021-01-11

## 2020-10-09 ASSESSMENT — ANXIETY QUESTIONNAIRES
1. FEELING NERVOUS, ANXIOUS, OR ON EDGE: NOT AT ALL
3. WORRYING TOO MUCH ABOUT DIFFERENT THINGS: NOT AT ALL
7. FEELING AFRAID AS IF SOMETHING AWFUL MIGHT HAPPEN: NOT AT ALL
6. BECOMING EASILY ANNOYED OR IRRITABLE: NOT AT ALL
5. BEING SO RESTLESS THAT IT IS HARD TO SIT STILL: NOT AT ALL
IF YOU CHECKED OFF ANY PROBLEMS ON THIS QUESTIONNAIRE, HOW DIFFICULT HAVE THESE PROBLEMS MADE IT FOR YOU TO DO YOUR WORK, TAKE CARE OF THINGS AT HOME, OR GET ALONG WITH OTHER PEOPLE: NOT DIFFICULT AT ALL
GAD7 TOTAL SCORE: 0
2. NOT BEING ABLE TO STOP OR CONTROL WORRYING: NOT AT ALL

## 2020-10-09 ASSESSMENT — PATIENT HEALTH QUESTIONNAIRE - PHQ9
5. POOR APPETITE OR OVEREATING: NOT AT ALL
SUM OF ALL RESPONSES TO PHQ QUESTIONS 1-9: 3

## 2020-10-09 ASSESSMENT — MIFFLIN-ST. JEOR: SCORE: 1511.86

## 2020-10-09 NOTE — LETTER
[facility]  [address]  [address]  [facility] [address]  [phone]      October 9, 2020      Aramis Flores  [address]  [facility] [address]      EMERGENCY CARE PLAN  Presenting Problem Treatment Plan   Questions or concerns during clinic hours I will call the clinic directly:    [facility]  [address] , [address]  [place] [address]  [phone]   Questions or concerns outside clinic hours  I will call the 24 hour line at [phone]   Patient needs to schedule an appointment  I will call the  scheduling line at [phone]   Same day treatment   I will call the clinic first, then  urgent care and/or  express care if needed   Clinic Care Coordinators Madison Taylor RN:  [phone]  [facility] Clinical Support Staff: [phone]    Crisis Services:  Behavioral or Mental Health [facility] (Behavioral Health Providers)   [phone]   Emergency treatment--Immediately CALL [phone]

## 2020-10-09 NOTE — PROGRESS NOTES
SUBJECTIVE:  Aramis Flores is a 80 year old male who complains of inversion injury to the right ankle 1 months ago. Immediate symptoms: delayed pain, delayed swelling. Symptoms have been unchanged since that time. Prior history of related problems: no prior problems with this area in the past, also with low back pain. There is pain and swelling at the lateral aspect of that ankle.   No specific injury but pain and discomfort in foot, ankle and right leg    OBJECTIVE:  He appears well, vital signs are normal. There is swelling and tenderness over the lateral malleolus. Also right fibula pain No tenderness over the medial aspect of the ankle. The fifth metatarsal is not tender. The ankle joint is intact without excessive opening on stressing. X-ray: no fracture or dislocation noted The rest of the foot, ankle and leg exam is normal.  Fight foot swelling    (M25.571) Pain in joint, ankle and foot, right  (primary encounter diagnosis)  Comment: elevation and ice therapy  Plan: X-ray rt ankle G/E 3 views*, PHYSICAL THERAPY         REFERRAL            (Z71.89) ACP (advance care planning)  Comment:   Plan:     (Z76.89) Health Care Home  Comment:   Plan:     (M79.661) Pain of right lower leg  Comment:   Plan: XR Tibia & Fibula Right 2 Views            (R26.81) Gait instability  Comment:   Plan: PHYSICAL THERAPY REFERRAL

## 2020-10-09 NOTE — NURSING NOTE
Chief Complaint   Patient presents with     Consult     review some health concerns, has some foot concerns     New Patient     new patient to this clinic, has seen Dr. Velásquez before     Recheck Medication     review medications     Pre-visit Screening:  Immunizations:  up to date  Colonoscopy:  is up to date  Mammogram: NA  Asthma Action Test/Plan:  NA  PHQ9:  Given today-new pt   GAD7:  Given today-new pt   Questioned patient about current smoking habits Pt. quit smoking some time ago.  Ok to leave detailed message on voice mail for today's visit only Yes, phone # [phone]

## 2020-10-10 ASSESSMENT — ANXIETY QUESTIONNAIRES: GAD7 TOTAL SCORE: 0

## 2020-10-15 ENCOUNTER — OFFICE VISIT (OUTPATIENT)
Dept: FAMILY MEDICINE | Facility: CLINIC | Age: 80
End: 2020-10-15

## 2020-10-15 VITALS
SYSTOLIC BLOOD PRESSURE: 150 MMHG | OXYGEN SATURATION: 100 % | BODY MASS INDEX: 29.44 KG/M2 | HEART RATE: 85 BPM | TEMPERATURE: 97.7 F | WEIGHT: 187.6 LBS | DIASTOLIC BLOOD PRESSURE: 60 MMHG | HEIGHT: 67 IN

## 2020-10-15 DIAGNOSIS — E11.40 TYPE 2 DIABETES MELLITUS WITH DIABETIC NEUROPATHY, WITHOUT LONG-TERM CURRENT USE OF INSULIN (H): ICD-10-CM

## 2020-10-15 DIAGNOSIS — M21.371 RIGHT FOOT DROP: Primary | ICD-10-CM

## 2020-10-15 PROCEDURE — 99213 OFFICE O/P EST LOW 20 MIN: CPT | Performed by: FAMILY MEDICINE

## 2020-10-15 ASSESSMENT — MIFFLIN-ST. JEOR: SCORE: 1514.58

## 2020-10-15 NOTE — NURSING NOTE
Aramis is here for [facility] PT recheck    Pre-visit Screening:  Immunizations:  up to date  Colonoscopy:  NA d/t age  Mammogram: NA  Asthma Action Test/Plan:  NA  PHQ9:  NA  GAD7:  NA  Questioned patient about current smoking habits Pt. quit smoking some time ago.  Ok to leave detailed message on voice mail for today's visit only Yes, phone # [phone]

## 2020-10-15 NOTE — PROGRESS NOTES
"SUBJECTIVE:  Right foot drop was seen in PT [facility] about nerve palsey  Some lack of balance   No numbness    Past Medical History:   Diagnosis Date     Degenerative joint disease      Diabetes (H)      Hypertension      Rosacea      Current Outpatient Medications   Medication Instructions     atorvastatin (LIPITOR) 40 mg, Oral, DAILY     blood glucose monitoring (NO BRAND SPECIFIED) test strip USE TO TEST ONCE TO TWICE DAILY     blood glucose monitoring (SOFTCLIX) lancets USE TO TEST ONCE TO TWICE DAILY     calcium carbonate 600 mg-vitamin D 400 units (CALTRATE) 600-400 MG-UNIT per tablet 1 tablet, Oral, DAILY     fish oil-omega-3 fatty acids 1 g, Oral, DAILY     fluocinonide (LIDEX) 0.05 % external cream APPLY TO THE AFFECTED AREAS ON BACK AND BILATERAL LEGS BID PRF ITCHING     glipiZIDE (GLUCOTROL XL) 10 mg, Oral, EVERY MORNING, (2 x 5mg = 10mg)     metFORMIN (GLUCOPHAGE) 1,000 mg, Oral, 2 TIMES DAILY, (2 x 500mg = 1000mg)     minocycline (MINOCIN) 100 mg, Oral, DAILY     multivitamin (CENTRUM SILVER) tablet 1 tablet, Oral, DAILY     vitamin D3 (CHOLECALCIFEROL) 2000 units tablet 1 tablet, Oral, DAILY      ROS: 10 point ROS neg other than the symptoms noted above in the HPI.    BP (!) 150/60 (BP Location: Left arm, Patient Position: Sitting, Cuff Size: Adult Large)   Pulse 85   Temp 97.7  F (36.5  C) (Oral)   Ht 1.702 m (5' 7\")   Wt 85.1 kg (187 lb 9.6 oz)   SpO2 100%   BMI 29.38 kg/m    GENERAL: no apparent distress      NECK: supple, no adenopathy.  CARDIAC: regular rate and rhythm, no murmur  RESP: clear, no wheezing, no rales, no rhonchi    SKIN: No rashes  Neuro: right leg poor foot flexion and external rotation    (M21.371) Right foot drop  (primary encounter diagnosis)  Comment:   Plan: NEUROLOGY ADULT REFERRAL            (E11.40) Type 2 diabetes mellitus with diabetic neuropathy, without long-term current use of insulin (H)  Comment:   Plan:       "

## 2020-10-30 ENCOUNTER — OFFICE VISIT (OUTPATIENT)
Dept: FAMILY MEDICINE | Facility: CLINIC | Age: 80
End: 2020-10-30

## 2020-10-30 VITALS
BODY MASS INDEX: 29.44 KG/M2 | OXYGEN SATURATION: 100 % | TEMPERATURE: 98.4 F | WEIGHT: 188 LBS | HEART RATE: 83 BPM | SYSTOLIC BLOOD PRESSURE: 142 MMHG | DIASTOLIC BLOOD PRESSURE: 70 MMHG

## 2020-10-30 DIAGNOSIS — M21.371 RIGHT FOOT DROP: Primary | ICD-10-CM

## 2020-10-30 PROCEDURE — 99213 OFFICE O/P EST LOW 20 MIN: CPT | Performed by: FAMILY MEDICINE

## 2020-10-30 NOTE — NURSING NOTE
Aramis is here for follow up of test results    Pre-visit Screening:  Immunizations:  up to date  Colonoscopy:  NA d/t age  Mammogram: NA  Asthma Action Test/Plan:  NA  PHQ9:  NA  GAD7:  NA  Questioned patient about current smoking habits Pt. quit smoking some time ago.  Ok to leave detailed message on voice mail for today's visit only Yes, phone # [phone]

## 2020-10-30 NOTE — PROGRESS NOTES
"SUBJECTIVE:  Right foot drop was seen in PT worried about nerve damage   had EMG to R/O Nerve damage which came back nml  Some lack of balance   No numbness     Past Medical History        Past Medical History:   Diagnosis Date     Degenerative joint disease       Diabetes (H)       Hypertension       Rosacea                Current Outpatient Medications   Medication Instructions     atorvastatin (LIPITOR) 40 mg, Oral, DAILY     blood glucose monitoring (NO BRAND SPECIFIED) test strip USE TO TEST ONCE TO TWICE DAILY     blood glucose monitoring (SOFTCLIX) lancets USE TO TEST ONCE TO TWICE DAILY     calcium carbonate 600 mg-vitamin D 400 units (CALTRATE) 600-400 MG-UNIT per tablet 1 tablet, Oral, DAILY     fish oil-omega-3 fatty acids 1 g, Oral, DAILY     fluocinonide (LIDEX) 0.05 % external cream APPLY TO THE AFFECTED AREAS ON BACK AND BILATERAL LEGS BID PRF ITCHING     glipiZIDE (GLUCOTROL XL) 10 mg, Oral, EVERY MORNING, (2 x 5mg = 10mg)     metFORMIN (GLUCOPHAGE) 1,000 mg, Oral, 2 TIMES DAILY, (2 x 500mg = 1000mg)     minocycline (MINOCIN) 100 mg, Oral, DAILY     multivitamin (CENTRUM SILVER) tablet 1 tablet, Oral, DAILY     vitamin D3 (CHOLECALCIFEROL) 2000 units tablet 1 tablet, Oral, DAILY       ROS: 10 point ROS neg other than the symptoms noted above in the HPI.     BP (!) 142/70 (BP Location: Left arm, Patient Position: Sitting, Cuff Size: Adult Large)   Pulse 85   Temp 97.7  F (36.5  C) (Oral)   Ht 1.702 m (5' 7\")   Wt 85.1 kg (187 lb 9.6 oz)   SpO2 100%   BMI 29.38 kg/m    GENERAL: no apparent distress        NECK: supple, no adenopathy.  CARDIAC: regular rate and rhythm, no murmur  RESP: clear, no wheezing, no rales, no rhonchi     SKIN: No rashes  Neuro: right leg poor foot flexion and external rotation     (M21.371) Right foot drop  (primary encounter diagnosis)  Comment:   Plan: EMG nml per report  Will send back to PT              (E11.40) Type 2 diabetes mellitus with diabetic neuropathy, " without long-term current use of insulin (H)  Comment:   Plan:

## 2020-11-06 ENCOUNTER — TELEPHONE (OUTPATIENT)
Dept: FAMILY MEDICINE | Facility: CLINIC | Age: 80
End: 2020-11-06

## 2020-11-06 ENCOUNTER — TRANSFERRED RECORDS (OUTPATIENT)
Dept: FAMILY MEDICINE | Facility: CLINIC | Age: 80
End: 2020-11-06

## 2021-01-11 ENCOUNTER — OFFICE VISIT (OUTPATIENT)
Dept: FAMILY MEDICINE | Facility: CLINIC | Age: 81
End: 2021-01-11

## 2021-01-11 VITALS
OXYGEN SATURATION: 99 % | SYSTOLIC BLOOD PRESSURE: 130 MMHG | HEIGHT: 67 IN | DIASTOLIC BLOOD PRESSURE: 60 MMHG | HEART RATE: 90 BPM | BODY MASS INDEX: 28.72 KG/M2 | TEMPERATURE: 97.7 F | WEIGHT: 183 LBS

## 2021-01-11 DIAGNOSIS — E78.2 MIXED HYPERLIPIDEMIA: ICD-10-CM

## 2021-01-11 DIAGNOSIS — E11.40 TYPE 2 DIABETES MELLITUS WITH DIABETIC NEUROPATHY, WITHOUT LONG-TERM CURRENT USE OF INSULIN (H): Primary | ICD-10-CM

## 2021-01-11 LAB
BUN SERPL-MCNC: 24 MG/DL (ref 7–25)
BUN/CREATININE RATIO: 21.8 (ref 6–22)
CALCIUM SERPL-MCNC: 9.4 MG/DL (ref 8.6–10.3)
CHLORIDE SERPLBLD-SCNC: 103.3 MMOL/L (ref 98–110)
CHOLEST SERPL-MCNC: 145 MG/DL (ref 0–199)
CHOLEST/HDLC SERPL: 4 {RATIO} (ref 0–5)
CO2 SERPL-SCNC: 28.2 MMOL/L (ref 20–32)
CREAT SERPL-MCNC: 1.1 MG/DL (ref 0.6–1.3)
GLUCOSE SERPL-MCNC: 159 MG/DL (ref 60–99)
HBA1C MFR BLD: 6.7 % (ref 4–7)
HDLC SERPL-MCNC: 38 MG/DL (ref 40–150)
HEMOGLOBIN: 13.7 G/DL (ref 13.3–17.7)
LDLC SERPL CALC-MCNC: 72 MG/DL (ref 0–130)
POTASSIUM SERPL-SCNC: 4.57 MMOL/L (ref 3.5–5.3)
SODIUM SERPL-SCNC: 140.4 MMOL/L (ref 135–146)
TRIGL SERPL-MCNC: 177 MG/DL (ref 0–149)

## 2021-01-11 PROCEDURE — 85018 HEMOGLOBIN: CPT | Performed by: FAMILY MEDICINE

## 2021-01-11 PROCEDURE — 80061 LIPID PANEL: CPT | Performed by: FAMILY MEDICINE

## 2021-01-11 PROCEDURE — 36415 COLL VENOUS BLD VENIPUNCTURE: CPT | Performed by: FAMILY MEDICINE

## 2021-01-11 PROCEDURE — 80048 BASIC METABOLIC PNL TOTAL CA: CPT | Performed by: FAMILY MEDICINE

## 2021-01-11 PROCEDURE — 99214 OFFICE O/P EST MOD 30 MIN: CPT | Performed by: FAMILY MEDICINE

## 2021-01-11 PROCEDURE — 83036 HEMOGLOBIN GLYCOSYLATED A1C: CPT | Performed by: FAMILY MEDICINE

## 2021-01-11 ASSESSMENT — MIFFLIN-ST. JEOR: SCORE: 1489.74

## 2021-01-11 NOTE — PROGRESS NOTES
(E11.40) Type 2 diabetes mellitus with diabetic neuropathy, without long-term current use of insulin (H)  (primary encounter diagnosis)  Comment:   Recent Results (from the past 24 hour(s))   HEMOGLOBIN (BFP)    Collection Time: 01/11/21 11:00 AM   Result Value Ref Range    Hemoglobin 13.7 13.3 - 17.7 g/dL   Hemoglobin A1c (BFP)    Collection Time: 01/11/21 11:04 AM   Result Value Ref Range    Hemoglobin A1C 6.7 4.0 - 7.0 %       Plan: Hemoglobin A1c (BFP), VENOUS COLLECTION, Basic         Metabolic Panel (BFP), VENOUS COLLECTION,         HEMOGLOBIN (BFP), CANCELED: HEMOGLOBIN            (E78.2) Mixed hyperlipidemia  Comment:   Plan: Lipid Panel (BFP), HEMOGLOBIN (BFP)                Sweetie Self is a 80 year old who presents to clinic today for the following health issues     HPI       Diabetes Follow-up    How often are you checking your blood sugar? One time daily  What time of day are you checking your blood sugars (select all that apply)?  Before meals  Have you had any blood sugars above 200?  No  Have you had any blood sugars below 70?  No    What symptoms do you notice when your blood sugar is low?  None    What concerns do you have today about your diabetes? None     Do you have any of these symptoms? (Select all that apply)  Numbness in feet      BP Readings from Last 2 Encounters:   01/11/21 130/60   10/30/20 (!) 142/70     Hemoglobin A1C (%)   Date Value   03/08/2019 7.1 (H)         Hyperlipidemia Follow-Up      Are you regularly taking any medication or supplement to lower your cholesterol?   Yes- lipitor    Are you having muscle aches or other side effects that you think could be caused by your cholesterol lowering medication?  No      How many servings of fruits and vegetables do you eat daily?  2-3    On average, how many sweetened beverages do you drink each day (Examples: soda, juice, sweet tea, etc.  Do NOT count diet or artificially sweetened beverages)?   0    How many days per week  "do you exercise enough to make your heart beat faster? 5    How many minutes a day do you exercise enough to make your heart beat faster? 20 - 29    How many days per week do you miss taking your medication? 0        Review of Systems   Constitutional, HEENT, cardiovascular, pulmonary, gi and gu systems are negative, except as otherwise noted.      Objective    /60 (BP Location: Right arm, Patient Position: Sitting, Cuff Size: Adult Large)   Pulse 90   Temp 97.7  F (36.5  C) (Oral)   Ht 1.695 m (5' 6.75\")   Wt 83 kg (183 lb)   SpO2 99%   BMI 28.88 kg/m    Body mass index is 28.88 kg/m .  Physical Exam   GENERAL: healthy, alert and no distress  EYES: Eyes grossly normal to inspection, PERRL and conjunctivae and sclerae normal  HENT: ear canals and TM's normal, nose and mouth without ulcers or lesions  NECK: no adenopathy, no asymmetry, masses, or scars and thyroid normal to palpation  RESP: lungs clear to auscultation - no rales, rhonchi or wheezes  CV: regular rate and rhythm, normal S1 S2, no S3 or S4, no murmur, click or rub, no peripheral edema and peripheral pulses strong  ABDOMEN: soft, nontender, no hepatosplenomegaly, no masses and bowel sounds normal  MS: no gross musculoskeletal defects noted, no edema  SKIN: no suspicious lesions or rashes  NEURO: Normal strength and tone, mentation intact and speech normal    Results for orders placed or performed in visit on 01/11/21 (from the past 24 hour(s))   HEMOGLOBIN (BFP)   Result Value Ref Range    Hemoglobin 13.7 13.3 - 17.7 g/dL   Hemoglobin A1c (BFP)   Result Value Ref Range    Hemoglobin A1C 6.7 4.0 - 7.0 %                 "

## 2021-01-11 NOTE — LETTER
January 11, 2021      Aramis Flores  [address] [facility] [address]        Dear ,    We are writing to inform you of your test results.    Your test results fall within the expected range(s) or remain unchanged from previous results.  Please continue with current treatment plan.   Diabetes doing well  Kidney fnct doing well   Overall lipid profile doing well    Resulted Orders   Hemoglobin A1c (BFP)   Result Value Ref Range    Hemoglobin A1C 6.7 4.0 - 7.0 %   Lipid Panel (BFP)   Result Value Ref Range    Cholesterol 145 0 - 199 mg/dL    Triglycerides 177 (A) 0 - 149 mg/dL    HDL Cholesterol 38 (A) 40 - 150 mg/dL    LDL Cholesterol Direct 72 0 - 130 mg/dL    Cholesterol/HDL Ratio 4 0 - 5   Basic Metabolic Panel (BFP)   Result Value Ref Range    Carbon Dioxide 28.2 20 - 32 mmol/L    Creatinine 1.10 0.60 - 1.30 mg/dL    Glucose 159 (A) 60 - 99 mg/dL    Sodium 140.4 135 - 146 mmol/L    Potassium 4.57 3.5 - 5.3 mmol/L    Chloride 103.3 98 - 110 mmol/L    Urea Nitrogen 24 7 - 25 mg/dL    Calcium 9.4 8.6 - 10.3 mg/dL    BUN/Creatinine Ratio 21.8 6 - 22   HEMOGLOBIN (BFP)   Result Value Ref Range    Hemoglobin 13.7 13.3 - 17.7 g/dL       If you have any questions or concerns, please call the clinic at the number listed above.       Sincerely,      Yared Velásquez MD

## 2021-01-11 NOTE — NURSING NOTE
Aramis is here for fasting med check.      Pre-Visit Screening:  Immunizations:UTD  Colonoscopy:NA  Mammogram:NA  Asthma Action Test/Plan:NA  PHQ9:NA  GAD7:NA  Questioned patient about current smoking habits Pt.former smoker  OK to leave a detailed message on voice mail for today's visit yes, phone # [phone]

## 2021-01-26 ENCOUNTER — TELEPHONE (OUTPATIENT)
Dept: FAMILY MEDICINE | Facility: CLINIC | Age: 81
End: 2021-01-26

## 2021-01-26 NOTE — TELEPHONE ENCOUNTER
Pt called asking who you thought he should receive his care from now that you are retiring, he is interested isreal  Dr. Kenneth Pallas at [facility] , looking for your input on this.     Pt also mentioned a few months back he gave you a CD with images and would like this back, I am unsure where this went.    Thanks, Kristy

## 2021-01-27 NOTE — TELEPHONE ENCOUNTER
Notified pt we no longer have the CD, and Dr.Pallace is a good option. He understood and had no further questions or concerns.

## 2021-03-20 ENCOUNTER — HEALTH MAINTENANCE LETTER (OUTPATIENT)
Age: 81
End: 2021-03-20

## 2021-08-23 ENCOUNTER — OFFICE VISIT (OUTPATIENT)
Dept: OPTOMETRY | Facility: CLINIC | Age: 81
End: 2021-08-23
Payer: MEDICARE

## 2021-08-23 DIAGNOSIS — H52.203 HYPEROPIA OF BOTH EYES WITH ASTIGMATISM: Primary | ICD-10-CM

## 2021-08-23 DIAGNOSIS — H01.00A BLEPHARITIS OF UPPER AND LOWER EYELIDS OF BOTH EYES, UNSPECIFIED TYPE: ICD-10-CM

## 2021-08-23 DIAGNOSIS — Z96.1 PSEUDOPHAKIA OF BOTH EYES: ICD-10-CM

## 2021-08-23 DIAGNOSIS — E11.9 DIABETES MELLITUS WITHOUT OPHTHALMIC MANIFESTATIONS (H): ICD-10-CM

## 2021-08-23 DIAGNOSIS — H01.00B BLEPHARITIS OF UPPER AND LOWER EYELIDS OF BOTH EYES, UNSPECIFIED TYPE: ICD-10-CM

## 2021-08-23 DIAGNOSIS — H52.03 HYPEROPIA OF BOTH EYES WITH ASTIGMATISM: Primary | ICD-10-CM

## 2021-08-23 PROCEDURE — 92014 COMPRE OPH EXAM EST PT 1/>: CPT | Performed by: OPTOMETRIST

## 2021-08-23 PROCEDURE — 92015 DETERMINE REFRACTIVE STATE: CPT | Mod: GY | Performed by: OPTOMETRIST

## 2021-08-23 RX ORDER — ASPIRIN 325 MG
TABLET, DELAYED RELEASE (ENTERIC COATED) ORAL
COMMUNITY
Start: 2021-05-10

## 2021-08-23 RX ORDER — LOSARTAN POTASSIUM AND HYDROCHLOROTHIAZIDE 12.5; 1 MG/1; MG/1
TABLET ORAL
COMMUNITY
Start: 2021-07-13

## 2021-08-23 ASSESSMENT — REFRACTION_WEARINGRX
OD_ADD: +2.50
SPECS_TYPE: BIFOCAL
OS_SPHERE: -1.50
OD_SPHERE: PLANO
OS_CYLINDER: +3.75
OS_ADD: +2.50
OD_AXIS: 032
OD_CYLINDER: +1.50
OS_AXIS: 151

## 2021-08-23 ASSESSMENT — VISUAL ACUITY
OS_CC: 20/80-1
OS_SC: 20/70
OD_SC+: -2
METHOD: SNELLEN - LINEAR
OS_SC: 20/80-1
CORRECTION_TYPE: GLASSES
OD_CC+: -1
OD_SC: 20/50
OS_CC: 20/50
OD_CC: 20/50
OD_SC: 20/80-1
OD_CC: 20/60

## 2021-08-23 ASSESSMENT — REFRACTION_MANIFEST
OD_CYLINDER: +4.75
OS_AXIS: 014
OD_SPHERE: -0.50
METHOD_AUTOREFRACTION: 1
OS_CYLINDER: +3.75
OD_ADD: +2.50
OS_ADD: +2.50
OD_AXIS: 177
OD_SPHERE: +13.00
OS_AXIS: 150
OD_CYLINDER: +2.25
OS_SPHERE: -1.50
OD_AXIS: 032
OS_CYLINDER: +9.25
OS_SPHERE: +12.25

## 2021-08-23 ASSESSMENT — CUP TO DISC RATIO
OS_RATIO: 0.2
OD_RATIO: 0.3

## 2021-08-23 ASSESSMENT — TONOMETRY
OS_IOP_MMHG: 15
OD_IOP_MMHG: 15
IOP_METHOD: APPLANATION

## 2021-08-23 ASSESSMENT — CONF VISUAL FIELD
METHOD: COUNTING FINGERS
OD_NORMAL: 1
OS_NORMAL: 1

## 2021-08-23 ASSESSMENT — EXTERNAL EXAM - RIGHT EYE: OD_EXAM: NORMAL

## 2021-08-23 ASSESSMENT — EXTERNAL EXAM - LEFT EYE: OS_EXAM: NORMAL

## 2021-08-23 NOTE — LETTER
8/23/2021         RE: Aramis Flores  [address] [facility] [address]        Dear Colleague,    Thank you for referring your patient, Aramis Flores, to the [facility]. Please see a copy of my visit note below.    Chief Complaint   Patient presents with     Diabetic Eye Exam     Accompanied by wife, Ivis  Chief Complaint(s) and History of Present Illness(es)     Diabetic Eye Exam     Diabetes Type: Type 2 and taking oral medications               Hemoglobin A1C   Date Value Ref Range Status   01/11/2021 6.7 4.0 - 7.0 % Final   03/08/2019 7.1 (H) 0 - 5.6 % Final     Comment:     Normal <5.7% Prediabetes 5.7-6.4%  Diabetes 6.5% or higher - adopted from ADA   consensus guidelines.              Last Eye Exam: 8/24/2020  Dilated Previously: Yes, side effects of dilation explained today    What are you currently using to see?  glasses    Distance Vision Acuity: Satisfied with vision    Near Vision Acuity: Satisfied with vision while reading and using computer with glasses    Eye Comfort: Feels like something is in his eyes in AM - blinks a lot and it gets better  Do you use eye drops? : Yes: AT's PRN  Occupation or Hobbies: Retired    [employer]     Medical, surgical and family histories reviewed and updated 8/23/2021.     History of high hyperopia  Amblyopia?  PCIOL w/ Dr. Okeefe  Diabetic       OBJECTIVE: See Ophthalmology exam    ASSESSMENT:    ICD-10-CM    1. Hyperopia of both eyes with astigmatism  H52.03 EYE EXAM (SIMPLE-NONBILLABLE)    H52.203 REFRACTION   2. 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 Pseudophakia of both eyes  Z96.1    4. Blepharitis of upper and lower eyelids of both eyes, unspecified type  H01.00A     H01.00B       PLAN:  Discussed Warm compresses/ lid hygiene   No prescription change glucose control  Aramis Flores aware  eye exam results will be sent to Yared Velásquez  CONNIE Durbin OD           Again, thank you for allowing me to participate in the care of your patient.        Sincerely,        Nellie Durbin OD

## 2021-08-23 NOTE — PROGRESS NOTES
Chief Complaint   Patient presents with     Diabetic Eye Exam     Accompanied by wife, Ivis  Chief Complaint(s) and History of Present Illness(es)     Diabetic Eye Exam     Diabetes Type: Type 2 and taking oral medications               Hemoglobin A1C   Date Value Ref Range Status   01/11/2021 6.7 4.0 - 7.0 % Final   03/08/2019 7.1 (H) 0 - 5.6 % Final     Comment:     Normal <5.7% Prediabetes 5.7-6.4%  Diabetes 6.5% or higher - adopted from ADA   consensus guidelines.              Last Eye Exam: 8/24/2020  Dilated Previously: Yes, side effects of dilation explained today    What are you currently using to see?  glasses    Distance Vision Acuity: Satisfied with vision    Near Vision Acuity: Satisfied with vision while reading and using computer with glasses    Eye Comfort: Feels like something is in his eyes in AM - blinks a lot and it gets better  Do you use eye drops? : Yes: AT's PRN  Occupation or Hobbies: Retired    [employer]     Medical, surgical and family histories reviewed and updated 8/23/2021.     History of high hyperopia  Amblyopia?  PCIOL w/ Dr. Okeefe  Diabetic       OBJECTIVE: See Ophthalmology exam    ASSESSMENT:    ICD-10-CM    1. Hyperopia of both eyes with astigmatism  H52.03 EYE EXAM (SIMPLE-NONBILLABLE)    H52.203 REFRACTION   2. 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 Pseudophakia of both eyes  Z96.1    4. Blepharitis of upper and lower eyelids of both eyes, unspecified type  H01.00A     H01.00B       PLAN:  Discussed Warm compresses/ lid hygiene   No prescription change glucose control  Aramis Flores aware  eye exam results will be sent to Yared Velásquez OD

## 2021-08-23 NOTE — PATIENT INSTRUCTIONS
No prescription change  Patient Education   Diabetes weakens the blood vessels all over the body, including the eyes. Damage to the blood vessels in the eyes can cause swelling or bleeding into part of the eye (called the retina). This is called diabetic retinopathy (MARCELA-tin-AH-puh-thee). If not treated, this disease can cause vision loss or blindness.   Symptoms may include blurred or distorted vision, but many people have no symptoms. It's important to see your eye doctor regularly to check for problems.   Early treatment and good control can help protect your vision. Here are the things you can do to help prevent vision loss:      1. Keep your blood sugar levels under tight control.      2. Bring high blood pressure under control.      3. No smoking.      4. Have yearly dilated eye exams.    Good job on your blood glucose!    Blepharitis is a chronic or long term inflammation of the eyelids and eyelashes. It affects all ages. Causes include poor eyelid hygiene, excess oil production, staph bacteria or an allergic reaction.    Blepharitis may appear as greasy flakes on the base of the eyelashes, crusting of eyelashes and mild redness of the eyelid margins.  Sometimes it may result in an acute infection of a gland in the eyelid called a stye and sometimes painless firm nodules can form in the eyelid that do not resolve on their own and must be surgically removed.    Treatment includes warm compresses and lid hygiene with an antimicrobial lid scrub and sometimes a prescription ointment is needed.      Hot compresses/ warm soaks  Warm compresses are very beneficial to the normal functioning of the eye.   They help loosen up the eyelid debris that has collected on the eyelash follicles.  Overabundance of bacterial microorganisms along the eyelashes and lid margins induce stress on the tear film and promote inflammation.  Regular lid hygiene helps diminish the bacterial population to prevent inflammation and  infection.  Cleanse lids once daily with a lid cleansing product as directed such as Ocusoft or Sterilid which can be purchased at most pharmacies. Eyelid cleansers maintain clean and healthy eyelid margins. Ocusoft or sterilid are commercial products that are available as individual wrapped cleansing pads.  Diluted baby shampoo will work, but not as well and is a cheaper alternative.    Directions for warm soaks  There are few methods for hot compresses. Moisten a washcloth with hot water, or microwave for 10 seconds, being careful to not get the cloth too hot.   Then put the washcloth onto your eyelids for 5 minutes. It will cool quickly so a rice pack or eyemask that can be heated and laid on top of the washcloth will help retain the heat.

## 2021-08-29 ENCOUNTER — HEALTH MAINTENANCE LETTER (OUTPATIENT)
Age: 81
End: 2021-08-29

## 2021-10-24 ENCOUNTER — HEALTH MAINTENANCE LETTER (OUTPATIENT)
Age: 81
End: 2021-10-24

## 2022-04-10 ENCOUNTER — HEALTH MAINTENANCE LETTER (OUTPATIENT)
Age: 82
End: 2022-04-10

## 2022-08-30 ENCOUNTER — OFFICE VISIT (OUTPATIENT)
Dept: OPTOMETRY | Facility: CLINIC | Age: 82
End: 2022-08-30
Payer: MEDICARE

## 2022-08-30 DIAGNOSIS — H52.03 HYPEROPIA OF BOTH EYES WITH ASTIGMATISM: ICD-10-CM

## 2022-08-30 DIAGNOSIS — H52.203 HYPEROPIA OF BOTH EYES WITH ASTIGMATISM: ICD-10-CM

## 2022-08-30 DIAGNOSIS — Z96.1 PSEUDOPHAKIA OF BOTH EYES: ICD-10-CM

## 2022-08-30 DIAGNOSIS — E11.9 DIABETES MELLITUS WITHOUT OPHTHALMIC MANIFESTATIONS (H): Primary | ICD-10-CM

## 2022-08-30 PROCEDURE — 92014 COMPRE OPH EXAM EST PT 1/>: CPT | Performed by: OPTOMETRIST

## 2022-08-30 PROCEDURE — 92015 DETERMINE REFRACTIVE STATE: CPT | Mod: GY | Performed by: OPTOMETRIST

## 2022-08-30 ASSESSMENT — REFRACTION_MANIFEST
OS_SPHERE: -1.50
OD_CYLINDER: +2.00
OS_CYLINDER: +7.25
OS_AXIS: 044
OD_SPHERE: +14.50
OD_AXIS: 028
OD_SPHERE: -0.50
OD_AXIS: 159
OD_ADD: +2.50
OS_AXIS: 150
OS_ADD: +2.50
OS_SPHERE: +12.25
OD_CYLINDER: +3.75
OS_CYLINDER: +3.75
METHOD_AUTOREFRACTION: 1

## 2022-08-30 ASSESSMENT — TONOMETRY
IOP_METHOD: APPLANATION
OS_IOP_MMHG: 17
OD_IOP_MMHG: 16

## 2022-08-30 ASSESSMENT — REFRACTION_WEARINGRX
OS_CYLINDER: +3.75
OD_ADD: +2.50
OD_SPHERE: PLANO
OD_CYLINDER: +1.50
OD_AXIS: 032
SPECS_TYPE: BIFOCAL
OS_AXIS: 151
OS_SPHERE: -1.50
OS_ADD: +2.50

## 2022-08-30 ASSESSMENT — VISUAL ACUITY
OS_CC: 20/40
OD_CC: 20/70
METHOD: SNELLEN - LINEAR
OS_CC: 20/30
OD_SC: 20/60
OS_SC: 20/70
CORRECTION_TYPE: GLASSES
OD_PH_CC: 20/50
OD_CC: 20/80
OD_SC+: -2
OS_CC+: -1
OS_SC+: -2

## 2022-08-30 ASSESSMENT — CUP TO DISC RATIO
OD_RATIO: 0.3
OS_RATIO: 0.2

## 2022-08-30 ASSESSMENT — EXTERNAL EXAM - RIGHT EYE: OD_EXAM: NORMAL

## 2022-08-30 ASSESSMENT — EXTERNAL EXAM - LEFT EYE: OS_EXAM: NORMAL

## 2022-08-30 ASSESSMENT — CONF VISUAL FIELD
OS_NORMAL: 1
OD_NORMAL: 1
METHOD: COUNTING FINGERS

## 2022-08-30 NOTE — PATIENT INSTRUCTIONS
Patient Education   Diabetes weakens the blood vessels all over the body, including the eyes. Damage to the blood vessels in the eyes can cause swelling or bleeding into part of the eye (called the retina). This is called diabetic retinopathy (MARCELA-tin--pu-thee). If not treated, this disease can cause vision loss or blindness.   Symptoms may include blurred or distorted vision, but many people have no symptoms. It's important to see your eye doctor regularly to check for problems.   Early treatment and good control can help protect your vision. Here are the things you can do to help prevent vision loss:      1. Keep your blood sugar levels under tight control.      2. Bring high blood pressure under control.      3. No smoking.      4. Have yearly dilated eye exams.    No change needed

## 2022-08-30 NOTE — LETTER
8/30/2022         RE: Aramis Flores  [address] [address] [address]        Dear Colleague,    Thank you for referring your patient, Aramis Flores, to the [facility]. Please see a copy of my visit note below.    Chief Complaint   Patient presents with     Diabetic Eye Exam         Lab Results   Component Value Date    A1C 6.7 01/11/2021    A1C 7.1 03/08/2019     7.1 on 6/28/22       Last Eye Exam: 08/23/2021  Dilated Previously: yes, side effects of dilation explained today    What are you currently using to see?  glasses    Distance Vision Acuity: Satisfied with vision    Near Vision Acuity: Not satisfied     Eye Comfort: good  Do you use eye drops? : No      Crystal Mccain - Optometric Assistant       Medical, surgical and family histories reviewed and updated 8/30/2022.       OBJECTIVE: See Ophthalmology exam    ASSESSMENT:    ICD-10-CM    1. 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 Hyperopia of both eyes with astigmatism  H52.03     H52.203    3. Pseudophakia of both eyes  Z96.1        PLAN:  Glucose control no change  Aramis Flores aware  eye exam results will be sent to Yared Velásquez OD           Again, thank you for allowing me to participate in the care of your patient.        Sincerely,        Nellie uDrbin, OD

## 2022-08-30 NOTE — PROGRESS NOTES
Chief Complaint   Patient presents with     Diabetic Eye Exam         Lab Results   Component Value Date    A1C 6.7 01/11/2021    A1C 7.1 03/08/2019     7.1 on 6/28/22       Last Eye Exam: 08/23/2021  Dilated Previously: yes, side effects of dilation explained today    What are you currently using to see?  glasses    Distance Vision Acuity: Satisfied with vision    Near Vision Acuity: Not satisfied     Eye Comfort: good  Do you use eye drops? : No      Crystal Covarrubiasor - Optometric Assistant       Medical, surgical and family histories reviewed and updated 8/30/2022.       OBJECTIVE: See Ophthalmology exam    ASSESSMENT:    ICD-10-CM    1. 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 Hyperopia of both eyes with astigmatism  H52.03     H52.203    3. Pseudophakia of both eyes  Z96.1        PLAN:  Glucose control no change  Aramis Flores aware  eye exam results will be sent to Yared Velásquez OD

## 2022-10-16 ENCOUNTER — HEALTH MAINTENANCE LETTER (OUTPATIENT)
Age: 82
End: 2022-10-16

## 2023-06-01 ENCOUNTER — HEALTH MAINTENANCE LETTER (OUTPATIENT)
Age: 83
End: 2023-06-01

## 2023-07-11 ENCOUNTER — HOSPITAL ENCOUNTER (EMERGENCY)
Facility: CLINIC | Age: 83
Discharge: HOME | End: 2023-07-12
Attending: EMERGENCY MEDICINE | Admitting: EMERGENCY MEDICINE
Payer: MEDICARE

## 2023-07-11 DIAGNOSIS — R42 LIGHTHEADEDNESS: ICD-10-CM

## 2023-07-11 DIAGNOSIS — R26.81 UNSTEADY GAIT: ICD-10-CM

## 2023-07-11 LAB
ALBUMIN SERPL BCG-MCNC: 4.2 G/DL (ref 3.5–5.2)
ALP SERPL-CCNC: 69 U/L (ref 40–129)
ALT SERPL W P-5'-P-CCNC: 16 U/L (ref 0–70)
ANION GAP SERPL CALCULATED.3IONS-SCNC: 12 MMOL/L (ref 7–15)
AST SERPL W P-5'-P-CCNC: 29 U/L (ref 0–45)
BASOPHILS # BLD AUTO: 0 10E3/UL (ref 0–0.2)
BASOPHILS NFR BLD AUTO: 0 %
BILIRUB SERPL-MCNC: 0.4 MG/DL
BUN SERPL-MCNC: 28.4 MG/DL (ref 8–23)
CALCIUM SERPL-MCNC: 9.8 MG/DL (ref 8.8–10.2)
CHLORIDE SERPL-SCNC: 99 MMOL/L (ref 98–107)
CREAT SERPL-MCNC: 1.06 MG/DL (ref 0.67–1.17)
DEPRECATED HCO3 PLAS-SCNC: 26 MMOL/L (ref 22–29)
EOSINOPHIL # BLD AUTO: 0.1 10E3/UL (ref 0–0.7)
EOSINOPHIL NFR BLD AUTO: 1 %
ERYTHROCYTE [DISTWIDTH] IN BLOOD BY AUTOMATED COUNT: 12.5 % (ref 10–15)
GFR SERPL CREATININE-BSD FRML MDRD: 70 ML/MIN/1.73M2
GLUCOSE SERPL-MCNC: 232 MG/DL (ref 70–99)
HCT VFR BLD AUTO: 38.5 % (ref 40–53)
HGB BLD-MCNC: 13.1 G/DL (ref 13.3–17.7)
IMM GRANULOCYTES # BLD: 0 10E3/UL
IMM GRANULOCYTES NFR BLD: 0 %
LYMPHOCYTES # BLD AUTO: 2.3 10E3/UL (ref 0.8–5.3)
LYMPHOCYTES NFR BLD AUTO: 23 %
MCH RBC QN AUTO: 32.2 PG (ref 26.5–33)
MCHC RBC AUTO-ENTMCNC: 34 G/DL (ref 31.5–36.5)
MCV RBC AUTO: 95 FL (ref 78–100)
MONOCYTES # BLD AUTO: 0.8 10E3/UL (ref 0–1.3)
MONOCYTES NFR BLD AUTO: 8 %
NEUTROPHILS # BLD AUTO: 6.8 10E3/UL (ref 1.6–8.3)
NEUTROPHILS NFR BLD AUTO: 68 %
NRBC # BLD AUTO: 0 10E3/UL
NRBC BLD AUTO-RTO: 0 /100
PLATELET # BLD AUTO: 266 10E3/UL (ref 150–450)
POTASSIUM SERPL-SCNC: 4.6 MMOL/L (ref 3.4–5.3)
PROT SERPL-MCNC: 7.7 G/DL (ref 6.4–8.3)
RBC # BLD AUTO: 4.07 10E6/UL (ref 4.4–5.9)
SODIUM SERPL-SCNC: 137 MMOL/L (ref 136–145)
TROPONIN T SERPL HS-MCNC: 21 NG/L
WBC # BLD AUTO: 10.1 10E3/UL (ref 4–11)

## 2023-07-11 PROCEDURE — 84484 ASSAY OF TROPONIN QUANT: CPT | Performed by: EMERGENCY MEDICINE

## 2023-07-11 PROCEDURE — 99285 EMERGENCY DEPT VISIT HI MDM: CPT | Mod: 25

## 2023-07-11 PROCEDURE — 85025 COMPLETE CBC W/AUTO DIFF WBC: CPT | Performed by: EMERGENCY MEDICINE

## 2023-07-11 PROCEDURE — 258N000003 HC RX IP 258 OP 636: Performed by: EMERGENCY MEDICINE

## 2023-07-11 PROCEDURE — 93005 ELECTROCARDIOGRAM TRACING: CPT

## 2023-07-11 PROCEDURE — 80053 COMPREHEN METABOLIC PANEL: CPT | Performed by: EMERGENCY MEDICINE

## 2023-07-11 PROCEDURE — 36415 COLL VENOUS BLD VENIPUNCTURE: CPT | Performed by: EMERGENCY MEDICINE

## 2023-07-11 PROCEDURE — 96360 HYDRATION IV INFUSION INIT: CPT | Mod: 59

## 2023-07-11 RX ADMIN — SODIUM CHLORIDE 1000 ML: 9 INJECTION, SOLUTION INTRAVENOUS at 21:53

## 2023-07-11 ASSESSMENT — ACTIVITIES OF DAILY LIVING (ADL): ADLS_ACUITY_SCORE: 35

## 2023-07-12 ENCOUNTER — APPOINTMENT (OUTPATIENT)
Dept: MRI IMAGING | Facility: CLINIC | Age: 83
End: 2023-07-12
Attending: EMERGENCY MEDICINE
Payer: MEDICARE

## 2023-07-12 VITALS
HEIGHT: 66 IN | DIASTOLIC BLOOD PRESSURE: 72 MMHG | HEART RATE: 88 BPM | SYSTOLIC BLOOD PRESSURE: 145 MMHG | WEIGHT: 180 LBS | BODY MASS INDEX: 28.93 KG/M2 | OXYGEN SATURATION: 100 % | TEMPERATURE: 98.8 F | RESPIRATION RATE: 20 BRPM

## 2023-07-12 LAB
ATRIAL RATE - MUSE: 88 BPM
DIASTOLIC BLOOD PRESSURE - MUSE: NORMAL MMHG
INTERPRETATION ECG - MUSE: NORMAL
P AXIS - MUSE: 64 DEGREES
PR INTERVAL - MUSE: 142 MS
QRS DURATION - MUSE: 138 MS
QT - MUSE: 378 MS
QTC - MUSE: 457 MS
R AXIS - MUSE: 55 DEGREES
SYSTOLIC BLOOD PRESSURE - MUSE: NORMAL MMHG
T AXIS - MUSE: 20 DEGREES
VENTRICULAR RATE- MUSE: 88 BPM

## 2023-07-12 PROCEDURE — A9585 GADOBUTROL INJECTION: HCPCS | Performed by: EMERGENCY MEDICINE

## 2023-07-12 PROCEDURE — 70553 MRI BRAIN STEM W/O & W/DYE: CPT | Mod: MG

## 2023-07-12 PROCEDURE — G1010 CDSM STANSON: HCPCS

## 2023-07-12 PROCEDURE — 255N000002 HC RX 255 OP 636: Performed by: EMERGENCY MEDICINE

## 2023-07-12 RX ORDER — GADOBUTROL 604.72 MG/ML
10 INJECTION INTRAVENOUS ONCE
Status: COMPLETED | OUTPATIENT
Start: 2023-07-12 | End: 2023-07-12

## 2023-07-12 RX ADMIN — GADOBUTROL 10 ML: 604.72 INJECTION INTRAVENOUS at 00:36

## 2023-07-12 ASSESSMENT — ACTIVITIES OF DAILY LIVING (ADL): ADLS_ACUITY_SCORE: 35

## 2023-07-12 NOTE — ED TRIAGE NOTES
"Pt presents for evaluation of dizziness. Pt was at an appointment with his wife, who was getting a colonoscopy done. Pt was sitting for a couple hours, name was called for wife and when he stood up pt started to feel dizzy and started to sway. Staff grabbed a wheelchair for pt and advised pt to be seen. Has similar episodes upon rising in the past. Did eat breakfast around 0700, episode happened around 1100. Pt was given 2 apple juices at clinic. Also c/o left sided neck pain that has been intermittent for last couple weeks. Pt states \"in essence, he woke up with the pain\".  Went to [facility], had an EKG and was sent for further evaluation.      "

## 2023-07-12 NOTE — ED PROVIDER NOTES
"  History     Chief Complaint:  Dizziness     The history is provided by the patient and the spouse.      Aramis Flores is a 82 year old male with a history of type 2 diabetes and hypertension who presents with dizziness. Today at 1100, Aramis was waiting for his wife to get her colonoscopy done and after sitting for two hours, he fell to the ground when trying to stand up. He is feeling better now but starts to sway when he is standing. He mentions that he feels fine when sitting and laying down. He denies, headache, vision changes, numbness in arms and legs, speech problems, chest pain, or belly pain. Of note, his wife mentioned that he has pain on the back left side of his neck for the past 3-4 weeks.     Independent Historian:   Wife provides supplemental history as noted above.     Review of External Notes:       Medications:    Atorvastatin  Aspirin 325 mg   Glipizide  Losartan-hydrochlorothiazide  Metformin  Gabapentin   Amitriptyline     Past Medical History:    Degenerative joint disease  Type 2 Diabetes   Hypertension   Rosacea  Pure hyperglyceridemia  Right foot drop   Generalized edema  Radiculopathy, lumbosacral region   Anemia, unspecified   Arthritis of knee  Poliomyelitis   Vestibular neuronitis  Peripheral sensory neuropathy     Past Surgical History:    Arthroplasty Knee - Left  Hernia repair  Cataract IOL, bilateral   Colonoscopy   Flap closure of nose wound     Physical Exam     Patient Vitals for the past 24 hrs:   BP Temp Temp src Pulse Resp SpO2 Height Weight   07/12/23 0130 (!) 145/72 -- -- -- -- 100 % -- --   07/11/23 1945 136/69 98.8  F (37.1  C) Oral 88 20 99 % 1.676 m (5' 6\") 81.6 kg (180 lb)      Physical Exam  Constitutional: Well appearing.  HEENT: Atraumatic.  PERRL.  EOMI.  Moist mucous membranes.  Neck: Soft.  Supple.  No JVD.  Cardiac: Regular rate and rhythm.  No murmur or rub.  Respiratory: Clear to auscultation bilaterally.  No respiratory distress.  No wheezing, rhonchi, " or rales.  Abdomen: Soft and nontender. Nondistended.  Musculoskeletal: No edema.  Normal range of motion.  Neurologic: Alert and oriented x3.  Normal tone and bulk.  No facial drooping.  Normal speech.  5/5 strength in bilateral upper and lower extremities.  Sensation to light touch intact throughout.  Normal gait.  Skin: No rashes.  No edema.  Psych: Normal affect.  Normal behavior.      Emergency Department Course   ECG  ECG results from 07/11/23   EKG 12-lead, tracing only     Value    Systolic Blood Pressure     Diastolic Blood Pressure     Ventricular Rate 88    Atrial Rate 88    KY Interval 142    QRS Duration 138        QTc 457    P Axis 64    R AXIS 55    T Axis 20    Interpretation ECG      Sinus rhythm  Right bundle branch block  Possible Inferior infarct , age undetermined  No significant change compared to EKG dated 2/13/2019       Imaging:  MR Brain w/o & w Contrast   Final Result   IMPRESSION:   HEAD MRI:   1.  No acute infarct, mass, mass effect, or hemorrhage.   2.  Mild atrophy.      HEAD MRA:   Normal MRA [place].      NECK MRA:   Normal neck MRA.         MRA Angiogram Head w/o Contrast   Final Result   IMPRESSION:   HEAD MRI:   1.  No acute infarct, mass, mass effect, or hemorrhage.   2.  Mild atrophy.      HEAD MRA:   Normal MRA [place].      NECK MRA:   Normal neck MRA.         MRA Angiogram Neck w/o & w Contrast   Final Result   IMPRESSION:   HEAD MRI:   1.  No acute infarct, mass, mass effect, or hemorrhage.   2.  Mild atrophy.      HEAD MRA:   Normal MRA [place].      NECK MRA:   Normal neck MRA.            Report per radiology    Laboratory:  Labs Ordered and Resulted from Time of ED Arrival to Time of ED Departure   COMPREHENSIVE METABOLIC PANEL - Abnormal       Result Value    Sodium 137      Potassium 4.6      Chloride 99      Carbon Dioxide (CO2) 26      Anion Gap 12      Urea Nitrogen 28.4 (*)     Creatinine 1.06      Calcium 9.8      Glucose 232 (*)      Alkaline Phosphatase 69      AST 29      ALT 16      Protein Total 7.7      Albumin 4.2      Bilirubin Total 0.4      GFR Estimate 70     CBC WITH PLATELETS AND DIFFERENTIAL - Abnormal    WBC Count 10.1      RBC Count 4.07 (*)     Hemoglobin 13.1 (*)     Hematocrit 38.5 (*)     MCV 95      MCH 32.2      MCHC 34.0      RDW 12.5      Platelet Count 266      % Neutrophils 68      % Lymphocytes 23      % Monocytes 8      % Eosinophils 1      % Basophils 0      % Immature Granulocytes 0      NRBCs per 100 WBC 0      Absolute Neutrophils 6.8      Absolute Lymphocytes 2.3      Absolute Monocytes 0.8      Absolute Eosinophils 0.1      Absolute Basophils 0.0      Absolute Immature Granulocytes 0.0      Absolute NRBCs 0.0     TROPONIN T, HIGH SENSITIVITY - Normal    Troponin T, High Sensitivity 21        Emergency Department Course & Assessments:     Interventions:  Medications   0.9% sodium chloride BOLUS (0 mLs Intravenous Stopped 7/11/23 2252)   gadobutrol (GADAVIST) injection 10 mL (10 mLs Intravenous $Given 7/12/23 0036)   sodium chloride (PF) 0.9% PF flush 60 mL (100 mLs Intravenous $Given 7/12/23 0036)      Independent Interpretation (X-rays, CTs, rhythm strip):  None    Assessments/Consultations/Discussion of Management or Tests:   ED Course as of 07/12/23 0233   Tue Jul 11, 2023   2140 I obtained history and examined the patient as noted above.    Wed Jul 12, 2023   0146 I have updated and rechecked the patient.       Social Determinants of Health affecting care:   None    Disposition:  The patient was discharged to home.     Impression & Plan      Medical Decision Making:  Aramis Flores is an 79-year-old man is afebrile and hemodynamically stable.  EKG demonstrates sinus rhythm with no acute ischemic changes on my read.  Blood work as noted as above and grossly unrevealing.  No evidence of ACS.  He only gets lightheaded and unsteady gait when he stands up.  He is only drinking coffee and pop today and no  water.  He had some improvement with IV fluids.  His wife still feels like his gait is off and we obtain an MRI of the head with MRA of the head and neck which revealed no acute abnormalities to explain his symptoms.  No evidence of stroke.  He is feeling improved on reevaluation and is requesting discharge home which I think is reasonable.  He appears safe and is ambulating independently without difficulty.  He has no back pain or leg pain.  We discussed plan for very close primary care follow-up and stressed the need for hydration.  He is in agreement and feels comfortable with this plan.  We discussed very strict return precautions.  His questions were answered and he was in no distress at time of discharge.    Diagnosis:    ICD-10-CM    1. Lightheadedness  R42       2. Unsteady gait  R26.81          Scribe Disclosure:  I, Nicole Ferreira, am serving as a scribe at 2:17 AM on 7/12/2023 to document services personally performed by Leonardo Ambrosio MD based on my observations and the provider's statements to me.    7/12/2023   Leonardo Ambrosio MD Salay, Nicholas J, MD  07/12/23 0419

## 2023-08-26 ENCOUNTER — HEALTH MAINTENANCE LETTER (OUTPATIENT)
Age: 83
End: 2023-08-26

## 2023-08-31 ENCOUNTER — OFFICE VISIT (OUTPATIENT)
Dept: OPTOMETRY | Facility: CLINIC | Age: 83
End: 2023-08-31
Payer: MEDICARE

## 2023-08-31 DIAGNOSIS — E08.319 DIABETIC RETINOPATHY OF BOTH EYES WITHOUT MACULAR EDEMA ASSOCIATED WITH DIABETES MELLITUS DUE TO UNDERLYING CONDITION, UNSPECIFIED RETINOPATHY SEVERITY (H): Primary | ICD-10-CM

## 2023-08-31 DIAGNOSIS — H52.03 HYPEROPIA OF BOTH EYES WITH ASTIGMATISM: ICD-10-CM

## 2023-08-31 DIAGNOSIS — H52.203 HYPEROPIA OF BOTH EYES WITH ASTIGMATISM: ICD-10-CM

## 2023-08-31 DIAGNOSIS — Z96.1 PSEUDOPHAKIA OF BOTH EYES: ICD-10-CM

## 2023-08-31 PROCEDURE — 92014 COMPRE OPH EXAM EST PT 1/>: CPT | Performed by: OPTOMETRIST

## 2023-08-31 PROCEDURE — 92015 DETERMINE REFRACTIVE STATE: CPT | Mod: GY | Performed by: OPTOMETRIST

## 2023-08-31 ASSESSMENT — REFRACTION_MANIFEST
OD_AXIS: 028
OS_AXIS: 148
OD_CYLINDER: +2.25
OS_CYLINDER: +3.75
OD_SPHERE: -0.25
OS_SPHERE: -1.25
OD_ADD: +2.50
METHOD_AUTOREFRACTION: 1
OS_AXIS: 042
OD_SPHERE: NO READ
OS_SPHERE: +16.00
OS_ADD: +2.50
OS_CYLINDER: +2.00

## 2023-08-31 ASSESSMENT — REFRACTION_WEARINGRX
OS_ADD: +2.50
OD_SPHERE: PLANO
OS_AXIS: 151
OS_SPHERE: -1.50
OD_CYLINDER: +1.50
OD_ADD: +2.50
OS_CYLINDER: +3.75
OD_AXIS: 032
SPECS_TYPE: BIFOCAL

## 2023-08-31 ASSESSMENT — CONF VISUAL FIELD
OS_SUPERIOR_TEMPORAL_RESTRICTION: 0
OD_INFERIOR_NASAL_RESTRICTION: 0
OS_SUPERIOR_NASAL_RESTRICTION: 0
OD_SUPERIOR_NASAL_RESTRICTION: 0
OS_INFERIOR_NASAL_RESTRICTION: 0
METHOD: COUNTING FINGERS
OS_NORMAL: 1
OS_INFERIOR_TEMPORAL_RESTRICTION: 0
OD_NORMAL: 1
OD_SUPERIOR_TEMPORAL_RESTRICTION: 0
OD_INFERIOR_TEMPORAL_RESTRICTION: 0

## 2023-08-31 ASSESSMENT — EXTERNAL EXAM - LEFT EYE: OS_EXAM: NORMAL

## 2023-08-31 ASSESSMENT — CUP TO DISC RATIO
OS_RATIO: 0.2
OD_RATIO: 0.3

## 2023-08-31 ASSESSMENT — VISUAL ACUITY
OS_CC: 20/40
OS_SC: 20/70
CORRECTION_TYPE: GLASSES
OD_CC: 20/70
OD_CC: 20/60
OS_CC: 20/40
OD_SC: 20/80
METHOD: SNELLEN - LINEAR

## 2023-08-31 ASSESSMENT — TONOMETRY
OS_IOP_MMHG: 15
OD_IOP_MMHG: 15
IOP_METHOD: APPLANATION

## 2023-08-31 ASSESSMENT — EXTERNAL EXAM - RIGHT EYE: OD_EXAM: NORMAL

## 2023-08-31 NOTE — PATIENT INSTRUCTIONS
Patient Education   Diabetes weakens the blood vessels all over the body, including the eyes. Damage to the blood vessels in the eyes can cause swelling or bleeding into part of the eye (called the retina). This is called diabetic retinopathy (MARCELA-tin-AH-pu-thee). If not treated, this disease can cause vision loss or blindness.   Symptoms may include blurred or distorted vision, but many people have no symptoms. It's important to see your eye doctor regularly to check for problems.   Early treatment and good control can help protect your vision. Here are the things you can do to help prevent vision loss:      1. Keep your blood sugar levels under tight control.      2. Bring high blood pressure under control.      3. No smoking.      4. Have yearly dilated eye exams.    Mild retinopathy

## 2023-08-31 NOTE — PROGRESS NOTES
Chief Complaint   Patient presents with    Diabetic Eye Exam     Lab Results   Component Value Date    A1C 6.7 01/11/2021    A1C 7.1 03/08/2019     Llast A1C 7.0       Last Eye Exam: 08/2022  Dilated Previously: Yes, side effects of dilation explained today    What are you currently using to see?  glasses    Distance Vision Acuity: Satisfied with vision    Near Vision Acuity: Satisfied with vision while reading and using computer with glasses    Eye Comfort: good  Do you use eye drops? : No      Crystal Mccain - Optometric Assistant      Medical, surgical and family histories reviewed and updated 8/31/2023.       OBJECTIVE: See Ophthalmology exam    ASSESSMENT:    ICD-10-CM    1. 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 Hyperopia of both eyes with astigmatism  H52.03 EYE EXAM (SIMPLE-NONBILLABLE)    H52.203 REFRACTION      3. Pseudophakia of both eyes  Z96.1         Stable vision / ocular health   PLAN:  Glucose control  No prescription change required  Faxed diabetic note to [facility] PCP  Aramis Flores aware  eye exam results will be sent to [place], Kenneth G. Kristine L. Johnson OD

## 2024-01-02 ENCOUNTER — ALLIED HEALTH/NURSE VISIT (OUTPATIENT)
Dept: OPTOMETRY | Facility: CLINIC | Age: 84
End: 2024-01-02
Payer: MEDICARE

## 2024-01-02 DIAGNOSIS — H51.11 CI (CONVERGENCE INSUFFICIENCY): Primary | ICD-10-CM

## 2024-01-02 DIAGNOSIS — H43.393 VITREOUS FLOATERS OF BOTH EYES: ICD-10-CM

## 2024-01-02 DIAGNOSIS — H04.129 DRY EYE: ICD-10-CM

## 2024-01-02 PROCEDURE — 92012 INTRM OPH EXAM EST PATIENT: CPT | Performed by: OPTOMETRIST

## 2024-01-02 ASSESSMENT — VISUAL ACUITY
METHOD: SNELLEN - LINEAR
OS_CC: 20/30-1
OD_CC: 20/50
OS_CC+: -1
OD_CC: 20/60
OD_CC+: -2
OS_CC: 20/30
CORRECTION_TYPE: GLASSES

## 2024-01-02 ASSESSMENT — TONOMETRY
OD_IOP_MMHG: 18
IOP_METHOD: APPLANATION
OS_IOP_MMHG: 18

## 2024-01-02 ASSESSMENT — EXTERNAL EXAM - LEFT EYE: OS_EXAM: NORMAL

## 2024-01-02 ASSESSMENT — CUP TO DISC RATIO
OS_RATIO: 0.2
OD_RATIO: 0.3

## 2024-01-02 ASSESSMENT — EXTERNAL EXAM - RIGHT EYE: OD_EXAM: NORMAL

## 2024-01-02 NOTE — PROGRESS NOTES
Chief Complaint   Patient presents with    Blurred Vision Evaluation      {Accompanied by:[id number]}  Last Eye Exam: ***  Dilated Previously: {YES / NO:470190}    What are you currently using to see?  {options:520809}       Distance Vision Acuity: {VISION:520747}    Near Vision Acuity: {VISION:187131}    Eye Comfort: {EYE COMFORT:716070}  Do you use eye drops? : {YES (EXPLAIN)/NO:216802}  Occupation or Hobbies: ***    [unfilled]          Medical, surgical and family histories reviewed and updated 1/2/2024.       OBJECTIVE: See Ophthalmology exam    ASSESSMENT:  No diagnosis found.    PLAN:     There are no Patient Instructions on file for this visit.

## 2024-01-02 NOTE — PROGRESS NOTES
Chief Complaint   Patient presents with    Blurred Vision Evaluation   Patient presents with his left eye not focusing in the morning. He says this started a month ago. He tried to read his blood monitoring numbers in the morning and can't see it in his glasses. He is also getting double vision when looking at his phone. He states his glasses are a few years old.     Accompanied by his wife Ivis Mccain - Optometric Assistant    Double vision is only for an hour in the am when looking at his phone, it goes away with further distance and after an hour  As does the blurred vision, or difficulty focusing  New floaters ? Both eyes   Minimal prescription change no subjective improvement    At last exams   See Review Of Systems     History of mild background diabetic retinopathy, PCIOL    Medical, surgical and family histories reviewed and updated 1/2/2024.         OBJECTIVE: See Ophthalmology exam    ASSESSMENT:    ICD-10-CM    1. 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 Dry eye  H04.129       3. Vitreous floaters of both eyes  H43.393        Intermittent loss of fusion at near early am normal cover test     Retina intact   Floaters   PLAN:  Monitor if increases in frequency will add prism     Nellie Durbin OD

## 2024-01-13 ENCOUNTER — HEALTH MAINTENANCE LETTER (OUTPATIENT)
Age: 84
End: 2024-01-13

## 2024-06-17 PROBLEM — Z76.89 HEALTH CARE HOME: Status: RESOLVED | Noted: 2020-10-09 | Resolved: 2024-06-17

## 2024-08-10 ENCOUNTER — HEALTH MAINTENANCE LETTER (OUTPATIENT)
Age: 84
End: 2024-08-10

## 2024-09-12 ENCOUNTER — OFFICE VISIT (OUTPATIENT)
Dept: OPTOMETRY | Facility: CLINIC | Age: 84
End: 2024-09-12
Payer: MEDICARE

## 2024-09-12 DIAGNOSIS — H52.03 HYPEROPIA OF BOTH EYES WITH ASTIGMATISM: ICD-10-CM

## 2024-09-12 DIAGNOSIS — E08.319 DIABETIC RETINOPATHY OF BOTH EYES WITHOUT MACULAR EDEMA ASSOCIATED WITH DIABETES MELLITUS DUE TO UNDERLYING CONDITION, UNSPECIFIED RETINOPATHY SEVERITY (H): Primary | ICD-10-CM

## 2024-09-12 DIAGNOSIS — H52.203 HYPEROPIA OF BOTH EYES WITH ASTIGMATISM: ICD-10-CM

## 2024-09-12 DIAGNOSIS — H52.4 PRESBYOPIA: ICD-10-CM

## 2024-09-12 DIAGNOSIS — Z96.1 PSEUDOPHAKIA OF BOTH EYES: ICD-10-CM

## 2024-09-12 PROCEDURE — 92015 DETERMINE REFRACTIVE STATE: CPT | Performed by: OPTOMETRIST

## 2024-09-12 PROCEDURE — 92014 COMPRE OPH EXAM EST PT 1/>: CPT | Performed by: OPTOMETRIST

## 2024-09-12 ASSESSMENT — EXTERNAL EXAM - LEFT EYE: OS_EXAM: NORMAL

## 2024-09-12 ASSESSMENT — KERATOMETRY
OS_K1POWER_DIOPTERS: 44.50
OD_K1POWER_DIOPTERS: 42.87
OS_AXISANGLE_DEGREES: 115
OD_AXISANGLE2_DEGREES: 131
OD_K2POWER_DIOPTERS: 45.00
OS_AXISANGLE2_DEGREES: 025
OD_AXISANGLE_DEGREES: 041
OS_K2POWER_DIOPTERS: 46.00

## 2024-09-12 ASSESSMENT — REFRACTION_WEARINGRX
OS_CYLINDER: +3.75
OS_SPHERE: -1.50
SPECS_TYPE: BIFOCAL
OS_AXIS: 151
OD_ADD: +2.50
OD_AXIS: 032
OS_ADD: +2.50
OD_CYLINDER: +1.50
OD_SPHERE: PLANO

## 2024-09-12 ASSESSMENT — REFRACTION_MANIFEST
OD_CYLINDER: +2.00
OS_CYLINDER: +3.75
OS_ADD: +2.50
OD_ADD: +2.50
OS_AXIS: 150
OD_AXIS: 030
OS_SPHERE: -1.00
OD_SPHERE: PLANO

## 2024-09-12 ASSESSMENT — TONOMETRY
IOP_METHOD: APPLANATION
OD_IOP_MMHG: 16
OS_IOP_MMHG: 16

## 2024-09-12 ASSESSMENT — CONF VISUAL FIELD
OS_SUPERIOR_NASAL_RESTRICTION: 0
OD_INFERIOR_NASAL_RESTRICTION: 0
OD_INFERIOR_TEMPORAL_RESTRICTION: 0
METHOD: COUNTING FINGERS
OS_NORMAL: 1
OS_INFERIOR_NASAL_RESTRICTION: 0
OS_INFERIOR_TEMPORAL_RESTRICTION: 0
OS_SUPERIOR_TEMPORAL_RESTRICTION: 0
OD_SUPERIOR_NASAL_RESTRICTION: 0
OD_SUPERIOR_TEMPORAL_RESTRICTION: 0
OD_NORMAL: 1

## 2024-09-12 ASSESSMENT — VISUAL ACUITY
OS_CC: 20/50
OS_PH_CC: 20/40
OS_CC+: +3
OD_CC: 20/60
OS_CC: J7
OD_PH_CC+: +3
OD_CC: J8
METHOD: SNELLEN - LINEAR
CORRECTION_TYPE: GLASSES

## 2024-09-12 ASSESSMENT — EXTERNAL EXAM - RIGHT EYE: OD_EXAM: NORMAL

## 2024-09-12 ASSESSMENT — CUP TO DISC RATIO
OD_RATIO: 0.3
OS_RATIO: 0.2

## 2024-09-12 NOTE — PROGRESS NOTES
Chief Complaint   Patient presents with    Diabetic Eye Exam     Accompanied by wife  Chief Complaint(s) and History of Present Illness(es)       Diabetic Eye Exam                    Lab Results   Component Value Date    A1C 6.7 01/11/2021    A1C 7.1 03/08/2019            Last Eye Exam: Aug 2023  Dilated Previously: Yes, side effects of dilation explained today    What are you currently using to see?  Glasses    Pt also mentions he sees brown spot in left eye.     Distance Vision Acuity: Has noticed decrease in vision since last vision check in Jan 2024    Near Vision Acuity: Not satisfied - Pt having more issues focusing when playing games on phone    Eye Comfort: good  Do you use eye drops? : No  Occupation or Hobbies: retired    Jewels Santos, OA        Medical, surgical and family histories reviewed and updated 9/12/2024.     Pohx amblyopia bilateral high hyperope each eye prior to CE w/ bcva 20/40  CE / PCIOL each eye +yag  Diabetic over 20 y   Diagnosed w/ mild dementia     OBJECTIVE: See Ophthalmology exam    ASSESSMENT:    ICD-10-CM    1. 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 Presbyopia  H52.4       3. Hyperopia of both eyes with astigmatism  H52.03     H52.203       4. Pseudophakia of both eyes  Z96.1         Vision is stable   Retinopathy stable   PLAN:  Ongoing glucose control   No prescription change     Aramis Flores aware  eye exam results will be sent to [place], Kenneth G. Kristine L. Johnson OD

## 2024-09-12 NOTE — LETTER
9/12/2024      Aramis Flores  [address] [address] [place] [id number]      Dear Colleague,    Thank you for referring your patient, Aramis Flores, to the [facility]. Please see a copy of my visit note below.    Chief Complaint   Patient presents with     Diabetic Eye Exam     Accompanied by wife  Chief Complaint(s) and History of Present Illness(es)       Diabetic Eye Exam                    Lab Results   Component Value Date    A1C 6.7 01/11/2021    A1C 7.1 03/08/2019            Last Eye Exam: Aug 2023  Dilated Previously: Yes, side effects of dilation explained today    What are you currently using to see?  Glasses    Pt also mentions he sees brown spot in left eye.     Distance Vision Acuity: Has noticed decrease in vision since last vision check in Jan 2024    Near Vision Acuity: Not satisfied - Pt having more issues focusing when playing games on phone    Eye Comfort: good  Do you use eye drops? : No  Occupation or Hobbies: retired    Jewels Santos, OA        Medical, surgical and family histories reviewed and updated 9/12/2024.     Pohx amblyopia bilateral high hyperope each eye prior to CE w/ bcva 20/40  CE / PCIOL each eye +yag  Diabetic over 20 y   Diagnosed w/ mild dementia     OBJECTIVE: See Ophthalmology exam    ASSESSMENT:    ICD-10-CM    1. 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 Presbyopia  H52.4       3. Hyperopia of both eyes with astigmatism  H52.03     H52.203       4. Pseudophakia of both eyes  Z96.1         Vision is stable   Retinopathy stable   PLAN:  Ongoing glucose control   No prescription change     Aramis Flores aware  eye exam results will be sent to [place], Kenneth G. Kristine L. Johnson OD       Again, thank you for allowing me to participate in the care of your patient.        Sincerely,        Nellie Durbin,  OD

## 2024-09-12 NOTE — PATIENT INSTRUCTIONS
Patient Education   Diabetes weakens the blood vessels all over the body, including the eyes. Damage to the blood vessels in the eyes can cause swelling or bleeding into part of the eye (called the retina). This is called diabetic retinopathy (MARCELA-tin--pu-thee). If not treated, this disease can cause vision loss or blindness.   Symptoms may include blurred or distorted vision, but many people have no symptoms. It's important to see your eye doctor regularly to check for problems.   Early treatment and good control can help protect your vision. Here are the things you can do to help prevent vision loss:      1. Keep your blood sugar levels under tight control.      2. Bring high blood pressure under control.      3. No smoking.      4. Have yearly dilated eye exams.    Stable visual acuity

## 2025-01-22 ENCOUNTER — TRANSFERRED RECORDS (OUTPATIENT)
Dept: HEALTH INFORMATION MANAGEMENT | Facility: CLINIC | Age: 85
End: 2025-01-22

## 2025-02-22 ENCOUNTER — HEALTH MAINTENANCE LETTER (OUTPATIENT)
Age: 85
End: 2025-02-22

## 2025-02-27 RX ORDER — MEMANTINE HYDROCHLORIDE 10 MG/1
10 TABLET ORAL 2 TIMES DAILY
COMMUNITY

## 2025-02-27 RX ORDER — TRAMADOL HYDROCHLORIDE 50 MG/1
50 TABLET ORAL EVERY 6 HOURS PRN
COMMUNITY
Start: 2024-12-20 | End: 2025-03-28

## 2025-02-27 RX ORDER — DONEPEZIL HYDROCHLORIDE 10 MG/1
10 TABLET, FILM COATED ORAL DAILY
COMMUNITY
Start: 2023-10-24

## 2025-02-27 RX ORDER — ASPIRIN 81 MG/1
81 TABLET ORAL DAILY
COMMUNITY

## 2025-02-27 RX ORDER — CYCLOBENZAPRINE HCL 10 MG
10 TABLET ORAL
Status: ON HOLD | COMMUNITY
End: 2025-04-21

## 2025-02-27 RX ORDER — TAMSULOSIN HYDROCHLORIDE 0.4 MG/1
1 CAPSULE ORAL DAILY
COMMUNITY
Start: 2024-12-27

## 2025-02-27 RX ORDER — TERBINAFINE HYDROCHLORIDE 250 MG/1
1 TABLET ORAL DAILY
COMMUNITY
Start: 2024-12-27 | End: 2025-03-28

## 2025-02-27 RX ORDER — FERROUS SULFATE 325(65) MG
325 TABLET ORAL
COMMUNITY

## 2025-02-28 RX ORDER — MINOCYCLINE HYDROCHLORIDE 100 MG/1
100 TABLET ORAL DAILY
COMMUNITY
End: 2025-04-16

## 2025-03-19 ENCOUNTER — OFFICE VISIT (OUTPATIENT)
Dept: OPTOMETRY | Facility: CLINIC | Age: 85
End: 2025-03-19
Payer: COMMERCIAL

## 2025-03-19 DIAGNOSIS — H04.129 DRY EYE: ICD-10-CM

## 2025-03-19 DIAGNOSIS — Z96.1 PSEUDOPHAKIA OF BOTH EYES: ICD-10-CM

## 2025-03-19 DIAGNOSIS — H52.03 HYPEROPIA OF BOTH EYES WITH ASTIGMATISM: ICD-10-CM

## 2025-03-19 DIAGNOSIS — E08.319 DIABETIC RETINOPATHY OF BOTH EYES WITHOUT MACULAR EDEMA ASSOCIATED WITH DIABETES MELLITUS DUE TO UNDERLYING CONDITION, UNSPECIFIED RETINOPATHY SEVERITY (H): Primary | ICD-10-CM

## 2025-03-19 DIAGNOSIS — H52.4 PRESBYOPIA: ICD-10-CM

## 2025-03-19 DIAGNOSIS — H26.491: ICD-10-CM

## 2025-03-19 DIAGNOSIS — H52.203 HYPEROPIA OF BOTH EYES WITH ASTIGMATISM: ICD-10-CM

## 2025-03-19 ASSESSMENT — REFRACTION_WEARINGRX
OS_SPHERE: -1.50
OD_CYLINDER: +1.50
OD_AXIS: 032
OS_AXIS: 151
OD_ADD: +2.50
OS_ADD: +2.50
SPECS_TYPE: BIFOCAL
OD_SPHERE: PLANO
OS_CYLINDER: +3.75

## 2025-03-19 ASSESSMENT — VISUAL ACUITY
OD_CC: 20/60-1
CORRECTION_TYPE: GLASSES
METHOD: SNELLEN - LINEAR
OD_PH_CC: 20/70
OS_CC: 20/40
OS_CC+: -2

## 2025-03-19 ASSESSMENT — CUP TO DISC RATIO
OS_RATIO: 0.2
OD_RATIO: 0.3

## 2025-03-19 ASSESSMENT — EXTERNAL EXAM - LEFT EYE: OS_EXAM: NORMAL

## 2025-03-19 ASSESSMENT — TONOMETRY
IOP_METHOD: APPLANATION
OD_IOP_MMHG: 12
OS_IOP_MMHG: 12

## 2025-03-19 ASSESSMENT — EXTERNAL EXAM - RIGHT EYE: OD_EXAM: NORMAL

## 2025-03-19 NOTE — LETTER
3/19/2025      Aramis Flores  [address] [address] [place] [id number]      Dear Colleague,    Thank you for referring your patient, Aramis Flores, to the [facility]. Please see a copy of my visit note below.    Chief Complaint   Patient presents with     Diabetic Eye Exam         Accompanied by wife     Patient reports vision is stable since last eye exam in September 2024        Crystal Mccain - Optometric Assistant      Lab Results   Component Value Date    A1C 6.7 01/11/2021    A1C 7.1 03/08/2019 12/24 8.0  See Review Of Systems     Mild background diabetic retinopathy   CE +yag some fibrosis R visual acuity decreased if he moves head to left (nasal aspect of R eye)  Has back surgery scheduled   Medical, surgical and family histories reviewed and updated 3/19/2025.         OBJECTIVE: See Ophthalmology exam    ASSESSMENT:    ICD-10-CM    1. 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 Presbyopia  H52.4       3. Hyperopia of both eyes with astigmatism  H52.03     H52.203       4. Pseudophakia of both eyes  Z96.1       5. Dry eye  H04.129       6. 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        Sincerely,        Nellie Durbin, OD    Electronically signed

## 2025-03-19 NOTE — PROGRESS NOTES
Chief Complaint   Patient presents with    Diabetic Eye Exam         Accompanied by wife     Patient reports vision is stable since last eye exam in September 2024        Crystal Mccain - Optometric Assistant      Lab Results   Component Value Date    A1C 6.7 01/11/2021    A1C 7.1 03/08/2019 12/24 8.0  See Review Of Systems     Mild background diabetic retinopathy   CE +yag some fibrosis R visual acuity decreased if he moves head to left (nasal aspect of R eye)  Has back surgery scheduled   Medical, surgical and family histories reviewed and updated 3/19/2025.         OBJECTIVE: See Ophthalmology exam    ASSESSMENT:    ICD-10-CM    1. 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 Presbyopia  H52.4       3. Hyperopia of both eyes with astigmatism  H52.03     H52.203       4. Pseudophakia of both eyes  Z96.1       5. Dry eye  H04.129       6. 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

## 2025-04-16 RX ORDER — MINOCYCLINE HYDROCHLORIDE 100 MG/1
100 CAPSULE ORAL DAILY
COMMUNITY

## 2025-04-16 NOTE — PHARMACY-ADMISSION MEDICATION HISTORY
Pre-Admission Medication History  Medication history and patient interview completed by pre-admitting RN or pre-op/PACU RN. Reviewed by pharmacist, including [facility] dispense records, [facility] Care Everywhere, and chart review.       Jeff Funk, Pharm.D., [facility] Med List   Medication Sig Last Dose/Taking    aspirin 81 MG EC tablet Take 81 mg by mouth daily. Taking    atorvastatin (LIPITOR) 40 MG tablet Take 40 mg by mouth daily  Taking    calcium carbonate 600 mg-vitamin D 400 units (CALTRATE) 600-400 MG-UNIT per tablet Take 1 tablet by mouth daily Taking    cyclobenzaprine (FLEXERIL) 10 MG tablet Take 10 mg by mouth nightly as needed. Taking As Needed    donepezil (ARICEPT) 10 MG tablet Take 10 mg by mouth daily. Taking    empagliflozin (JARDIANCE) 10 MG TABS tablet Take 10 mg by mouth daily. Taking    ferrous sulfate (FEROSUL) 325 (65 Fe) MG tablet Take 325 mg by mouth daily (with breakfast). Taking    glipiZIDE (GLUCOTROL XL) 5 MG 24 hr tablet Take 5 mg by mouth every morning. Taking    losartan-hydrochlorothiazide (HYZAAR) 100-12.5 MG tablet Take 1 tablet by mouth daily. Taking    memantine (NAMENDA) 10 MG tablet Take 10 mg by mouth 2 times daily. Taking    metFORMIN (GLUCOPHAGE) 1000 MG tablet Take 1,000 mg by mouth daily (with breakfast). Taking    metFORMIN (GLUCOPHAGE) 500 MG tablet Take 500 mg by mouth daily (with dinner). Taking    minocycline (MINOCIN) 100 MG capsule Take 100 mg by mouth daily. Taking    multivitamin (CENTRUM SILVER) tablet Take 1 tablet by mouth daily Taking    tamsulosin (FLOMAX) 0.4 MG capsule Take 1 capsule by mouth daily. Taking    vitamin B-12 (CYANOCOBALAMIN) 1000 MCG tablet Take 1,000 mcg by mouth Taking

## 2025-04-18 ENCOUNTER — APPOINTMENT (OUTPATIENT)
Dept: GENERAL RADIOLOGY | Facility: CLINIC | Age: 85
DRG: 451 | End: 2025-04-18
Attending: NEUROLOGICAL SURGERY
Payer: MEDICARE

## 2025-04-18 ENCOUNTER — HOSPITAL ENCOUNTER (INPATIENT)
Facility: CLINIC | Age: 85
LOS: 3 days | Discharge: HOME | DRG: 451 | End: 2025-04-21
Attending: NEUROLOGICAL SURGERY | Admitting: NEUROLOGICAL SURGERY
Payer: MEDICARE

## 2025-04-18 DIAGNOSIS — Z98.1 S/P LUMBAR FUSION: Primary | ICD-10-CM

## 2025-04-18 LAB
GLUCOSE BLDC GLUCOMTR-MCNC: 167 MG/DL (ref 70–99)
GLUCOSE BLDC GLUCOMTR-MCNC: 181 MG/DL (ref 70–99)
GLUCOSE BLDC GLUCOMTR-MCNC: 250 MG/DL (ref 70–99)

## 2025-04-18 PROCEDURE — 250N000013 HC RX MED GY IP 250 OP 250 PS 637: Performed by: NEUROLOGICAL SURGERY

## 2025-04-18 PROCEDURE — 272N000001 HC OR GENERAL SUPPLY STERILE: Performed by: NEUROLOGICAL SURGERY

## 2025-04-18 PROCEDURE — 258N000003 HC RX IP 258 OP 636: Performed by: ANESTHESIOLOGY

## 2025-04-18 PROCEDURE — 0SG0071 FUSION OF LUMBAR VERTEBRAL JOINT WITH AUTOLOGOUS TISSUE SUBSTITUTE, POSTERIOR APPROACH, POSTERIOR COLUMN, OPEN APPROACH: ICD-10-PCS | Performed by: NEUROLOGICAL SURGERY

## 2025-04-18 PROCEDURE — C1713 ANCHOR/SCREW BN/BN,TIS/BN: HCPCS | Performed by: NEUROLOGICAL SURGERY

## 2025-04-18 PROCEDURE — 258N000003 HC RX IP 258 OP 636: Performed by: NEUROLOGICAL SURGERY

## 2025-04-18 PROCEDURE — 370N000017 HC ANESTHESIA TECHNICAL FEE, PER MIN: Performed by: NEUROLOGICAL SURGERY

## 2025-04-18 PROCEDURE — 120N000001 HC R&B MED SURG/OB

## 2025-04-18 PROCEDURE — 250N000009 HC RX 250: Performed by: NEUROLOGICAL SURGERY

## 2025-04-18 PROCEDURE — 8E0WXBF COMPUTER ASSISTED PROCEDURE OF TRUNK REGION, WITH FLUOROSCOPY: ICD-10-PCS | Performed by: NEUROLOGICAL SURGERY

## 2025-04-18 PROCEDURE — C1762 CONN TISS, HUMAN(INC FASCIA): HCPCS | Performed by: NEUROLOGICAL SURGERY

## 2025-04-18 PROCEDURE — 250N000011 HC RX IP 250 OP 636: Performed by: NEUROLOGICAL SURGERY

## 2025-04-18 PROCEDURE — 710N000009 HC RECOVERY PHASE 1, LEVEL 1, PER MIN: Performed by: NEUROLOGICAL SURGERY

## 2025-04-18 PROCEDURE — 250N000005 HC OR RX SURGIFLO HEMOSTATIC MATRIX 10ML 199102S OPNP: Performed by: NEUROLOGICAL SURGERY

## 2025-04-18 PROCEDURE — 999N000141 HC STATISTIC PRE-PROCEDURE NURSING ASSESSMENT: Performed by: NEUROLOGICAL SURGERY

## 2025-04-18 PROCEDURE — 250N000025 HC SEVOFLURANE, PER MIN: Performed by: NEUROLOGICAL SURGERY

## 2025-04-18 PROCEDURE — 360N000085 HC SURGERY LEVEL 5 W/ FLUORO, PER MIN: Performed by: NEUROLOGICAL SURGERY

## 2025-04-18 PROCEDURE — 999N000182 XR SURGERY OARM

## 2025-04-18 PROCEDURE — 999N000179 XR SURGERY CARM FLUORO LESS THAN 5 MIN W STILLS

## 2025-04-18 PROCEDURE — 258N000001 HC RX 258: Performed by: NEUROLOGICAL SURGERY

## 2025-04-18 PROCEDURE — 01NB0ZZ RELEASE LUMBAR NERVE, OPEN APPROACH: ICD-10-PCS | Performed by: NEUROLOGICAL SURGERY

## 2025-04-18 DEVICE — CAP LCK CREO 5.5: Type: IMPLANTABLE DEVICE | Site: SPINE LUMBAR | Status: FUNCTIONAL

## 2025-04-18 DEVICE — 6.5X45 HA COATED SCREW, PREASSEMBLED, CREO 5.5MM
Type: IMPLANTABLE DEVICE | Site: SPINE LUMBAR | Status: FUNCTIONAL
Brand: CREO

## 2025-04-18 DEVICE — GRAFT BONE STIMULAN MATRIX KIT RAPID CURE 5ML 620-005: Type: IMPLANTABLE DEVICE | Site: SPINE LUMBAR | Status: FUNCTIONAL

## 2025-04-18 DEVICE — IMPLANTABLE DEVICE: Type: IMPLANTABLE DEVICE | Site: SPINE LUMBAR | Status: FUNCTIONAL

## 2025-04-18 DEVICE — DBM 7509215 MAGNIFUSE 1 X 5CM
Type: IMPLANTABLE DEVICE | Site: SPINE LUMBAR | Status: FUNCTIONAL
Brand: MAGNIFUSE® BONE GRAFT

## 2025-04-18 RX ORDER — ONDANSETRON 4 MG/1
4 TABLET, ORALLY DISINTEGRATING ORAL EVERY 6 HOURS PRN
Status: DISCONTINUED | OUTPATIENT
Start: 2025-04-18 | End: 2025-04-21 | Stop reason: HOSPADM

## 2025-04-18 RX ORDER — LABETALOL HYDROCHLORIDE 5 MG/ML
10 INJECTION, SOLUTION INTRAVENOUS
Status: DISCONTINUED | OUTPATIENT
Start: 2025-04-18 | End: 2025-04-18 | Stop reason: HOSPADM

## 2025-04-18 RX ORDER — CEFAZOLIN SODIUM 1 G/3ML
INJECTION, POWDER, FOR SOLUTION INTRAMUSCULAR; INTRAVENOUS PRN
Status: DISCONTINUED | OUTPATIENT
Start: 2025-04-18 | End: 2025-04-18 | Stop reason: HOSPADM

## 2025-04-18 RX ORDER — VANCOMYCIN HYDROCHLORIDE 1 G/20ML
INJECTION, POWDER, LYOPHILIZED, FOR SOLUTION INTRAVENOUS PRN
Status: DISCONTINUED | OUTPATIENT
Start: 2025-04-18 | End: 2025-04-18 | Stop reason: HOSPADM

## 2025-04-18 RX ORDER — DIPHENHYDRAMINE HCL 12.5MG/5ML
12.5 LIQUID (ML) ORAL EVERY 6 HOURS PRN
Status: DISCONTINUED | OUTPATIENT
Start: 2025-04-18 | End: 2025-04-21 | Stop reason: HOSPADM

## 2025-04-18 RX ORDER — NALOXONE HYDROCHLORIDE 0.4 MG/ML
0.1 INJECTION, SOLUTION INTRAMUSCULAR; INTRAVENOUS; SUBCUTANEOUS
Status: DISCONTINUED | OUTPATIENT
Start: 2025-04-18 | End: 2025-04-18 | Stop reason: HOSPADM

## 2025-04-18 RX ORDER — POLYETHYLENE GLYCOL 3350 17 G/17G
17 POWDER, FOR SOLUTION ORAL DAILY
Status: DISCONTINUED | OUTPATIENT
Start: 2025-04-19 | End: 2025-04-21 | Stop reason: HOSPADM

## 2025-04-18 RX ORDER — NICOTINE POLACRILEX 4 MG
15-30 LOZENGE BUCCAL
Status: DISCONTINUED | OUTPATIENT
Start: 2025-04-18 | End: 2025-04-21 | Stop reason: HOSPADM

## 2025-04-18 RX ORDER — CEFAZOLIN SODIUM 2 G/50ML
2 SOLUTION INTRAVENOUS EVERY 8 HOURS
Status: DISCONTINUED | OUTPATIENT
Start: 2025-04-18 | End: 2025-04-20

## 2025-04-18 RX ORDER — AMOXICILLIN 250 MG
1 CAPSULE ORAL 2 TIMES DAILY
Status: DISCONTINUED | OUTPATIENT
Start: 2025-04-18 | End: 2025-04-21 | Stop reason: HOSPADM

## 2025-04-18 RX ORDER — HYDROMORPHONE HCL IN WATER/PF 6 MG/30 ML
0.2 PATIENT CONTROLLED ANALGESIA SYRINGE INTRAVENOUS EVERY 4 HOURS PRN
Status: DISCONTINUED | OUTPATIENT
Start: 2025-04-18 | End: 2025-04-21 | Stop reason: HOSPADM

## 2025-04-18 RX ORDER — DIPHENHYDRAMINE HYDROCHLORIDE 50 MG/ML
12.5 INJECTION, SOLUTION INTRAMUSCULAR; INTRAVENOUS EVERY 6 HOURS PRN
Status: DISCONTINUED | OUTPATIENT
Start: 2025-04-18 | End: 2025-04-21 | Stop reason: HOSPADM

## 2025-04-18 RX ORDER — GLIPIZIDE 5 MG/1
5 TABLET, FILM COATED, EXTENDED RELEASE ORAL EVERY MORNING
Status: DISCONTINUED | OUTPATIENT
Start: 2025-04-19 | End: 2025-04-21 | Stop reason: HOSPADM

## 2025-04-18 RX ORDER — PROCHLORPERAZINE MALEATE 5 MG/1
5 TABLET ORAL EVERY 6 HOURS PRN
Status: DISCONTINUED | OUTPATIENT
Start: 2025-04-18 | End: 2025-04-21 | Stop reason: HOSPADM

## 2025-04-18 RX ORDER — LIDOCAINE 40 MG/G
CREAM TOPICAL
Status: DISCONTINUED | OUTPATIENT
Start: 2025-04-18 | End: 2025-04-21 | Stop reason: HOSPADM

## 2025-04-18 RX ORDER — ONDANSETRON 2 MG/ML
4 INJECTION INTRAMUSCULAR; INTRAVENOUS EVERY 6 HOURS PRN
Status: DISCONTINUED | OUTPATIENT
Start: 2025-04-18 | End: 2025-04-21 | Stop reason: HOSPADM

## 2025-04-18 RX ORDER — SODIUM CHLORIDE 9 MG/ML
INJECTION, SOLUTION INTRAVENOUS CONTINUOUS
Status: DISCONTINUED | OUTPATIENT
Start: 2025-04-18 | End: 2025-04-20

## 2025-04-18 RX ORDER — DEXTROSE MONOHYDRATE 25 G/50ML
25-50 INJECTION, SOLUTION INTRAVENOUS
Status: DISCONTINUED | OUTPATIENT
Start: 2025-04-18 | End: 2025-04-21 | Stop reason: HOSPADM

## 2025-04-18 RX ORDER — ONDANSETRON 4 MG/1
4 TABLET, ORALLY DISINTEGRATING ORAL EVERY 30 MIN PRN
Status: DISCONTINUED | OUTPATIENT
Start: 2025-04-18 | End: 2025-04-18 | Stop reason: HOSPADM

## 2025-04-18 RX ORDER — OXYCODONE HYDROCHLORIDE 5 MG/1
5 TABLET ORAL EVERY 4 HOURS PRN
Status: DISCONTINUED | OUTPATIENT
Start: 2025-04-18 | End: 2025-04-21 | Stop reason: HOSPADM

## 2025-04-18 RX ORDER — GENTAMICIN 40 MG/ML
INJECTION, SOLUTION INTRAMUSCULAR; INTRAVENOUS PRN
Status: DISCONTINUED | OUTPATIENT
Start: 2025-04-18 | End: 2025-04-18 | Stop reason: HOSPADM

## 2025-04-18 RX ORDER — HYDROMORPHONE HCL IN WATER/PF 6 MG/30 ML
0.2 PATIENT CONTROLLED ANALGESIA SYRINGE INTRAVENOUS
Status: DISCONTINUED | OUTPATIENT
Start: 2025-04-18 | End: 2025-04-21 | Stop reason: HOSPADM

## 2025-04-18 RX ORDER — DEXAMETHASONE SODIUM PHOSPHATE 4 MG/ML
4 INJECTION, SOLUTION INTRA-ARTICULAR; INTRALESIONAL; INTRAMUSCULAR; INTRAVENOUS; SOFT TISSUE
Status: DISCONTINUED | OUTPATIENT
Start: 2025-04-18 | End: 2025-04-18 | Stop reason: HOSPADM

## 2025-04-18 RX ORDER — CEFAZOLIN SODIUM/WATER 2 G/20 ML
2 SYRINGE (ML) INTRAVENOUS
Status: COMPLETED | OUTPATIENT
Start: 2025-04-18 | End: 2025-04-18

## 2025-04-18 RX ORDER — ONDANSETRON 2 MG/ML
4 INJECTION INTRAMUSCULAR; INTRAVENOUS EVERY 30 MIN PRN
Status: DISCONTINUED | OUTPATIENT
Start: 2025-04-18 | End: 2025-04-18 | Stop reason: HOSPADM

## 2025-04-18 RX ORDER — FENTANYL CITRATE 50 UG/ML
25 INJECTION, SOLUTION INTRAMUSCULAR; INTRAVENOUS EVERY 5 MIN PRN
Status: DISCONTINUED | OUTPATIENT
Start: 2025-04-18 | End: 2025-04-18 | Stop reason: HOSPADM

## 2025-04-18 RX ORDER — HYDROMORPHONE HCL IN WATER/PF 6 MG/30 ML
0.2 PATIENT CONTROLLED ANALGESIA SYRINGE INTRAVENOUS EVERY 5 MIN PRN
Status: DISCONTINUED | OUTPATIENT
Start: 2025-04-18 | End: 2025-04-18 | Stop reason: HOSPADM

## 2025-04-18 RX ORDER — NALOXONE HYDROCHLORIDE 0.4 MG/ML
0.2 INJECTION, SOLUTION INTRAMUSCULAR; INTRAVENOUS; SUBCUTANEOUS
Status: DISCONTINUED | OUTPATIENT
Start: 2025-04-18 | End: 2025-04-21 | Stop reason: HOSPADM

## 2025-04-18 RX ORDER — CEFAZOLIN SODIUM/WATER 2 G/20 ML
2 SYRINGE (ML) INTRAVENOUS SEE ADMIN INSTRUCTIONS
Status: DISCONTINUED | OUTPATIENT
Start: 2025-04-18 | End: 2025-04-18 | Stop reason: HOSPADM

## 2025-04-18 RX ORDER — DONEPEZIL HYDROCHLORIDE 10 MG/1
10 TABLET, FILM COATED ORAL DAILY
Status: DISCONTINUED | OUTPATIENT
Start: 2025-04-18 | End: 2025-04-21 | Stop reason: HOSPADM

## 2025-04-18 RX ORDER — MEMANTINE HYDROCHLORIDE 5 MG/1
10 TABLET ORAL 2 TIMES DAILY
Status: DISCONTINUED | OUTPATIENT
Start: 2025-04-18 | End: 2025-04-21 | Stop reason: HOSPADM

## 2025-04-18 RX ORDER — ATORVASTATIN CALCIUM 40 MG/1
40 TABLET, FILM COATED ORAL DAILY
Status: DISCONTINUED | OUTPATIENT
Start: 2025-04-18 | End: 2025-04-21 | Stop reason: HOSPADM

## 2025-04-18 RX ORDER — SODIUM CHLORIDE, SODIUM LACTATE, POTASSIUM CHLORIDE, CALCIUM CHLORIDE 600; 310; 30; 20 MG/100ML; MG/100ML; MG/100ML; MG/100ML
INJECTION, SOLUTION INTRAVENOUS CONTINUOUS
Status: DISCONTINUED | OUTPATIENT
Start: 2025-04-18 | End: 2025-04-18 | Stop reason: HOSPADM

## 2025-04-18 RX ORDER — FERROUS SULFATE 325(65) MG
325 TABLET ORAL
Status: DISCONTINUED | OUTPATIENT
Start: 2025-04-19 | End: 2025-04-21 | Stop reason: HOSPADM

## 2025-04-18 RX ORDER — MULTIPLE VITAMINS W/ MINERALS TAB 9MG-400MCG
1 TAB ORAL DAILY
Status: DISCONTINUED | OUTPATIENT
Start: 2025-04-19 | End: 2025-04-21 | Stop reason: HOSPADM

## 2025-04-18 RX ORDER — HYDROMORPHONE HCL IN WATER/PF 6 MG/30 ML
0.4 PATIENT CONTROLLED ANALGESIA SYRINGE INTRAVENOUS EVERY 5 MIN PRN
Status: DISCONTINUED | OUTPATIENT
Start: 2025-04-18 | End: 2025-04-18 | Stop reason: HOSPADM

## 2025-04-18 RX ORDER — FENTANYL CITRATE 50 UG/ML
50 INJECTION, SOLUTION INTRAMUSCULAR; INTRAVENOUS EVERY 5 MIN PRN
Status: DISCONTINUED | OUTPATIENT
Start: 2025-04-18 | End: 2025-04-18 | Stop reason: HOSPADM

## 2025-04-18 RX ORDER — LIDOCAINE 40 MG/G
CREAM TOPICAL
Status: DISCONTINUED | OUTPATIENT
Start: 2025-04-18 | End: 2025-04-18 | Stop reason: HOSPADM

## 2025-04-18 RX ORDER — BISACODYL 10 MG
10 SUPPOSITORY, RECTAL RECTAL DAILY PRN
Status: DISCONTINUED | OUTPATIENT
Start: 2025-04-21 | End: 2025-04-21 | Stop reason: HOSPADM

## 2025-04-18 RX ORDER — TAMSULOSIN HYDROCHLORIDE 0.4 MG/1
0.4 CAPSULE ORAL DAILY
Status: DISCONTINUED | OUTPATIENT
Start: 2025-04-18 | End: 2025-04-21 | Stop reason: HOSPADM

## 2025-04-18 RX ORDER — ALBUTEROL SULFATE 0.83 MG/ML
2.5 SOLUTION RESPIRATORY (INHALATION) EVERY 4 HOURS PRN
Status: DISCONTINUED | OUTPATIENT
Start: 2025-04-18 | End: 2025-04-18 | Stop reason: HOSPADM

## 2025-04-18 RX ORDER — HYDRALAZINE HYDROCHLORIDE 20 MG/ML
2.5-5 INJECTION INTRAMUSCULAR; INTRAVENOUS EVERY 10 MIN PRN
Status: DISCONTINUED | OUTPATIENT
Start: 2025-04-18 | End: 2025-04-18 | Stop reason: HOSPADM

## 2025-04-18 RX ORDER — ACETAMINOPHEN 325 MG/1
975 TABLET ORAL EVERY 8 HOURS
Status: DISCONTINUED | OUTPATIENT
Start: 2025-04-18 | End: 2025-04-21 | Stop reason: HOSPADM

## 2025-04-18 RX ORDER — NALOXONE HYDROCHLORIDE 0.4 MG/ML
0.4 INJECTION, SOLUTION INTRAMUSCULAR; INTRAVENOUS; SUBCUTANEOUS
Status: DISCONTINUED | OUTPATIENT
Start: 2025-04-18 | End: 2025-04-21 | Stop reason: HOSPADM

## 2025-04-18 RX ADMIN — SENNOSIDES AND DOCUSATE SODIUM 1 TABLET: 50; 8.6 TABLET ORAL at 21:11

## 2025-04-18 RX ADMIN — ACETAMINOPHEN 975 MG: 325 TABLET, FILM COATED ORAL at 16:41

## 2025-04-18 RX ADMIN — MEMANTINE 10 MG: 5 TABLET ORAL at 21:11

## 2025-04-18 RX ADMIN — DONEPEZIL HYDROCHLORIDE 10 MG: 10 TABLET ORAL at 18:29

## 2025-04-18 RX ADMIN — ATORVASTATIN CALCIUM 40 MG: 40 TABLET, FILM COATED ORAL at 18:29

## 2025-04-18 RX ADMIN — LOSARTAN POTASSIUM: 50 TABLET, FILM COATED ORAL at 18:29

## 2025-04-18 RX ADMIN — TAMSULOSIN HYDROCHLORIDE 0.4 MG: 0.4 CAPSULE ORAL at 18:29

## 2025-04-18 RX ADMIN — SODIUM CHLORIDE, SODIUM LACTATE, POTASSIUM CHLORIDE, AND CALCIUM CHLORIDE: .6; .31; .03; .02 INJECTION, SOLUTION INTRAVENOUS at 06:37

## 2025-04-18 RX ADMIN — SODIUM CHLORIDE: 9 INJECTION, SOLUTION INTRAVENOUS at 16:38

## 2025-04-18 RX ADMIN — CEFAZOLIN SODIUM 2 G: 2 SOLUTION INTRAVENOUS at 16:54

## 2025-04-18 RX ADMIN — EMPAGLIFLOZIN 10 MG: 10 TABLET, FILM COATED ORAL at 18:29

## 2025-04-18 ASSESSMENT — ACTIVITIES OF DAILY LIVING (ADL)
ADLS_ACUITY_SCORE: 26
ADLS_ACUITY_SCORE: 27
ADLS_ACUITY_SCORE: 26
ADLS_ACUITY_SCORE: 26
ADLS_ACUITY_SCORE: 27
ADLS_ACUITY_SCORE: 27
ADLS_ACUITY_SCORE: 25
ADLS_ACUITY_SCORE: 27
ADLS_ACUITY_SCORE: 27
ADLS_ACUITY_SCORE: 26
ADLS_ACUITY_SCORE: 27
ADLS_ACUITY_SCORE: 27
ADLS_ACUITY_SCORE: 26
ADLS_ACUITY_SCORE: 27
ADLS_ACUITY_SCORE: 27

## 2025-04-18 NOTE — OP NOTE
OPERATIVE REPORT        PREOPERATIVE DIAGNOSIS:   1.  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  Low back pain  3.  Right lower extremity radiculopathy  4.  Difficulty ambulating    POSTOPERATIVE DIAGNOSIS: Same    PROCEDURE:   1. Right L4-5 complete decompressive laminectomy with complete facetectomy and decompression and exposure of the L4 and L5 roots  2. Placement of Globus Creo JACOBSON pedicle screws bilaterally from L4-5 bilaterally  3. L4-5 posterior lateral fusion with placement of locally harvested autologous bone and Medtronic Magnafuse  4. Use of Stealth and O-arm intraoperative neuronavigation  5. Use of C-arm fluoroscopy and intraoperative microscope    SURGEON: Balaji Sommers MD, MS, FAANS     ASSISTANT: Rudy Mcgee SA    INDICATION FOR PROCEDURE: The patient is a 84 year old male that presented to my clinic with low back pain and right greater than left lower extremity radicular pain. After reviewing the examination and imaging studies, the decision was made to proceed with the above procedure. The patient understood the risks of surgery to be infection, CSF leak, nerve root injury, failure of hardware, the need for recurrent surgery, epidural fibrosis, weakness, coma and death. The patient voiced understanding and wanted to proceed.     DESCRIPTION OF PROCEDURE: The patient was seen in the pre op area and the procedure was discussed with the patient and family once again and all questions were answered. The consent was then signed and the lumbar spine was marked with a marker. The patient was transported to the operating room on a stretcher and received general endotracheal anesthesia and a Gutierrez catheter was placed. The patient was placed on the operating table in the prone position on the Harman frame with all pressure points padded. The back was then prepped and draped in a normal sterile fashion and C-arm was used to  identify the appropriate level. Local anesthesia was injected along the planned incision with 10 blade scalpel used to make a midline incision with dissection down through the subcutaneous tissue to the fascia. The fascia was opened bilaterally with the Bovie cautery. Subperiosteal dissection was used to expose the lamina facet joint and transverse processes from L4-5. The Versatrac retractor was then placed to retract the paraspinous muscles. Attention then turned to the decompression and the operating microscope was brought into the field and a right L4-5 complete decompressive laminectomy with complete facetectomy was performed with the Midas Perry drill, the Kerrison rongeur and the pituitary rongeur which were used to remove the right lamina and complete facet joint. This completely decompressed and exposed the 4 exiting root and the L5 traversing root on the right. The Pop ball hook was used to verify that the nerve roots were free at each level. Next, the Stealth and O-arm were brought in for intraoperative pedicle screw placement. The pedicle screws were placed using the normal technique of a  hole with the Midas Perry drill, the gear shift probe to penetrate the pedicle and the pedicle screws were then navigated down the pedicles of L4 and L5 bilaterally. Next, the connecting rods were secured from L4-5 and the locking caps were secured with the counter torque system. I then placed 5 ml of VistaSeal was placed over the dura. Copious irrigation was performed with saline. The wound was irrigated once again and the retractors were removed. Decortication of the lateral facet and transverse process was performed from L4-5 and locally harvested autologous bone and Medtronic Magnafuse were placed out laterally for the posterolateral fusion. Next, 2 Harman-Crockett drains were left subfascially. The fascia was closed with 0 Vicryl, the subcutaneous tissue closed with 2 - 0 and 3-0 Vicryl, and the skin closed with  Dermabond. The patient was then transported back to the stretcher, extubated, and sent to recovery.     At the end of the case, all counts were correct    No complications    Estimated blood loss 50 ml     IV fluids See Anesthesia    The patient received Ancef preoperatively and Stimulan with Ancef 500 ml and Gentamicin 120 mg

## 2025-04-18 NOTE — PROGRESS NOTES
Blood glucose 181 post op. JOE duenas with patient going to floor.  Alyse Becerra RN on 4/18/2025 at 12:03 PM

## 2025-04-18 NOTE — PLAN OF CARE
Goal Outcome Evaluation:      Plan of Care Reviewed With: patient    Overall Patient Progress: improvingOverall Patient Progress: improving     A&Ox4, RA, VSS. UMER drains in place, X1 walker GB Brace on when oob, patient walked well to chair. ACHS BS- patient refused bedtime insulin due to not taking insulin at home. He stated that his blood sugar has been higher before and it only goes high when he eats something he shouldn't. Diabetes education completed.  Continuous NS 50ml/hr. Pending discharge plans    Problem: Delirium  Goal: Optimal Coping  Outcome: Progressing  Goal: Improved Behavioral Control  Outcome: Progressing  Intervention: Minimize Safety Risk  Recent Flowsheet Documentation  Taken 4/18/2025 1641 by Sharlene Anderson, RN  Enhanced Safety Measures:   pain management   review medications for side effects with activity  Goal: Improved Attention and Thought Clarity  Outcome: Progressing  Goal: Improved Sleep  Outcome: Progressing     Problem: Adult Inpatient Plan of Care  Goal: Plan of Care Review  Outcome: Progressing  Flowsheets (Taken 4/18/2025 1807)  Plan of Care Reviewed With: patient  Overall Patient Progress: improving  Goal: Patient-Specific Goal (Individualized)  Outcome: Progressing  Goal: Absence of Hospital-Acquired Illness or Injury  Outcome: Progressing  Intervention: Identify and Manage Fall Risk  Recent Flowsheet Documentation  Taken 4/18/2025 1641 by Sharlene Anderson, RN  Safety Promotion/Fall Prevention:   activity supervised   assistive device/personal items within reach   safety round/check completed   room organization consistent   room near nurse's station  Intervention: Prevent Skin Injury  Recent Flowsheet Documentation  Taken 4/18/2025 1641 by Sharlene Anderson, RN  Body Position: supine, head elevated  Intervention: Prevent and Manage VTE (Venous Thromboembolism) Risk  Recent Flowsheet Documentation  Taken 4/18/2025 1641 by Sharlene Anderson, RN  VTE Prevention/Management: SCDs on  (sequential compression devices)  Intervention: Prevent Infection  Recent Flowsheet Documentation  Taken 4/18/2025 1641 by Sharlene Anderson RN  Infection Prevention:   hand hygiene promoted   rest/sleep promoted  Goal: Optimal Comfort and Wellbeing  Outcome: Progressing  Goal: Readiness for Transition of Care  Outcome: Progressing     Problem: Pain Acute  Goal: Optimal Pain Control and Function  Outcome: Progressing     Problem: Spinal Surgery  Goal: Optimal Coping with Surgery  Outcome: Progressing  Goal: Absence of Bleeding  Outcome: Progressing  Goal: Effective Bowel Elimination  Outcome: Progressing  Goal: Fluid and Electrolyte Balance  Outcome: Progressing  Goal: Optimal Functional Ability  Outcome: Progressing  Intervention: Optimize Functional Status  Recent Flowsheet Documentation  Taken 4/18/2025 1641 by Sharlene Anderson RN  Activity Management:   activity adjusted per tolerance   activity encouraged  Goal: Absence of Infection Signs and Symptoms  Outcome: Progressing  Intervention: Prevent or Manage Infection  Recent Flowsheet Documentation  Taken 4/18/2025 1641 by Sharlene Anderson RN  Infection Prevention:   hand hygiene promoted   rest/sleep promoted  Goal: Optimal Neurologic Function  Outcome: Progressing  Intervention: Optimize Neurologic Function  Recent Flowsheet Documentation  Taken 4/18/2025 1641 by Sharlene Anderson RN  Body Position: supine, head elevated  Goal: Anesthesia/Sedation Recovery  Outcome: Progressing  Intervention: Optimize Anesthesia Recovery  Recent Flowsheet Documentation  Taken 4/18/2025 1641 by Sharlene Anderson RN  Safety Promotion/Fall Prevention:   activity supervised   assistive device/personal items within reach   safety round/check completed   room organization consistent   room near nurse's station  Goal: Optimal Pain Control and Function  Outcome: Progressing  Goal: Nausea and Vomiting Relief  Outcome: Progressing  Goal: Effective Urinary Elimination  Outcome:  Progressing  Goal: Effective Oxygenation and Ventilation  Outcome: Progressing  Intervention: Optimize Oxygenation and Ventilation  Recent Flowsheet Documentation  Taken 4/18/2025 1641 by Sharlene Anderson, RN  Head of Bed (HOB) Positioning: HOB at 20-30 degrees

## 2025-04-19 ENCOUNTER — APPOINTMENT (OUTPATIENT)
Dept: PHYSICAL THERAPY | Facility: CLINIC | Age: 85
DRG: 451 | End: 2025-04-19
Attending: NEUROLOGICAL SURGERY
Payer: MEDICARE

## 2025-04-19 ENCOUNTER — APPOINTMENT (OUTPATIENT)
Dept: OCCUPATIONAL THERAPY | Facility: CLINIC | Age: 85
DRG: 451 | End: 2025-04-19
Attending: NEUROLOGICAL SURGERY
Payer: MEDICARE

## 2025-04-19 LAB
ANION GAP SERPL CALCULATED.3IONS-SCNC: 12 MMOL/L (ref 7–15)
BASOPHILS # BLD AUTO: 0 10E3/UL (ref 0–0.2)
BASOPHILS NFR BLD AUTO: 0 %
BUN SERPL-MCNC: 21.9 MG/DL (ref 8–23)
CALCIUM SERPL-MCNC: 8.5 MG/DL (ref 8.8–10.4)
CHLORIDE SERPL-SCNC: 107 MMOL/L (ref 98–107)
CREAT SERPL-MCNC: 1.06 MG/DL (ref 0.67–1.17)
EGFRCR SERPLBLD CKD-EPI 2021: 69 ML/MIN/1.73M2
EOSINOPHIL # BLD AUTO: 0 10E3/UL (ref 0–0.7)
EOSINOPHIL NFR BLD AUTO: 0 %
ERYTHROCYTE [DISTWIDTH] IN BLOOD BY AUTOMATED COUNT: 12.7 % (ref 10–15)
GLUCOSE BLDC GLUCOMTR-MCNC: 145 MG/DL (ref 70–99)
GLUCOSE BLDC GLUCOMTR-MCNC: 171 MG/DL (ref 70–99)
GLUCOSE BLDC GLUCOMTR-MCNC: 188 MG/DL (ref 70–99)
GLUCOSE BLDC GLUCOMTR-MCNC: 246 MG/DL (ref 70–99)
GLUCOSE BLDC GLUCOMTR-MCNC: 265 MG/DL (ref 70–99)
GLUCOSE SERPL-MCNC: 145 MG/DL (ref 70–99)
HCO3 SERPL-SCNC: 23 MMOL/L (ref 22–29)
HCT VFR BLD AUTO: 33.3 % (ref 40–53)
HGB BLD-MCNC: 11.2 G/DL (ref 13.3–17.7)
HGB BLD-MCNC: 11.3 G/DL (ref 13.3–17.7)
IMM GRANULOCYTES # BLD: 0.1 10E3/UL
IMM GRANULOCYTES NFR BLD: 1 %
LYMPHOCYTES # BLD AUTO: 3.1 10E3/UL (ref 0.8–5.3)
LYMPHOCYTES NFR BLD AUTO: 25 %
MAGNESIUM SERPL-MCNC: 1.8 MG/DL (ref 1.7–2.3)
MCH RBC QN AUTO: 32.2 PG (ref 26.5–33)
MCHC RBC AUTO-ENTMCNC: 33.6 G/DL (ref 31.5–36.5)
MCV RBC AUTO: 96 FL (ref 78–100)
MONOCYTES # BLD AUTO: 1.3 10E3/UL (ref 0–1.3)
MONOCYTES NFR BLD AUTO: 10 %
NEUTROPHILS # BLD AUTO: 7.8 10E3/UL (ref 1.6–8.3)
NEUTROPHILS NFR BLD AUTO: 64 %
NRBC # BLD AUTO: 0 10E3/UL
NRBC BLD AUTO-RTO: 0 /100
PLATELET # BLD AUTO: 211 10E3/UL (ref 150–450)
POTASSIUM SERPL-SCNC: 3.8 MMOL/L (ref 3.4–5.3)
RBC # BLD AUTO: 3.48 10E6/UL (ref 4.4–5.9)
SODIUM SERPL-SCNC: 142 MMOL/L (ref 135–145)
TROPONIN T SERPL HS-MCNC: 25 NG/L
TROPONIN T SERPL HS-MCNC: 29 NG/L
WBC # BLD AUTO: 12.2 10E3/UL (ref 4–11)

## 2025-04-19 PROCEDURE — 85018 HEMOGLOBIN: CPT

## 2025-04-19 PROCEDURE — 97535 SELF CARE MNGMENT TRAINING: CPT | Mod: GO | Performed by: OCCUPATIONAL THERAPIST

## 2025-04-19 PROCEDURE — 84484 ASSAY OF TROPONIN QUANT: CPT | Performed by: INTERNAL MEDICINE

## 2025-04-19 PROCEDURE — 258N000003 HC RX IP 258 OP 636: Performed by: INTERNAL MEDICINE

## 2025-04-19 PROCEDURE — 99222 1ST HOSP IP/OBS MODERATE 55: CPT | Mod: AI | Performed by: INTERNAL MEDICINE

## 2025-04-19 PROCEDURE — 97161 PT EVAL LOW COMPLEX 20 MIN: CPT | Mod: GP

## 2025-04-19 PROCEDURE — 97165 OT EVAL LOW COMPLEX 30 MIN: CPT | Mod: GO | Performed by: OCCUPATIONAL THERAPIST

## 2025-04-19 PROCEDURE — 97530 THERAPEUTIC ACTIVITIES: CPT | Mod: GP

## 2025-04-19 PROCEDURE — 97116 GAIT TRAINING THERAPY: CPT | Mod: GP

## 2025-04-19 PROCEDURE — 85014 HEMATOCRIT: CPT | Performed by: INTERNAL MEDICINE

## 2025-04-19 PROCEDURE — 80048 BASIC METABOLIC PNL TOTAL CA: CPT

## 2025-04-19 PROCEDURE — 250N000013 HC RX MED GY IP 250 OP 250 PS 637: Performed by: INTERNAL MEDICINE

## 2025-04-19 PROCEDURE — 120N000001 HC R&B MED SURG/OB

## 2025-04-19 PROCEDURE — 83735 ASSAY OF MAGNESIUM: CPT | Performed by: INTERNAL MEDICINE

## 2025-04-19 PROCEDURE — 250N000013 HC RX MED GY IP 250 OP 250 PS 637: Performed by: NEUROLOGICAL SURGERY

## 2025-04-19 PROCEDURE — 250N000011 HC RX IP 250 OP 636: Performed by: NEUROLOGICAL SURGERY

## 2025-04-19 PROCEDURE — 93010 ELECTROCARDIOGRAM REPORT: CPT | Performed by: INTERNAL MEDICINE

## 2025-04-19 PROCEDURE — 36415 COLL VENOUS BLD VENIPUNCTURE: CPT | Performed by: INTERNAL MEDICINE

## 2025-04-19 PROCEDURE — 36415 COLL VENOUS BLD VENIPUNCTURE: CPT

## 2025-04-19 RX ORDER — METOPROLOL TARTRATE 25 MG/1
25 TABLET, FILM COATED ORAL 2 TIMES DAILY
Status: DISCONTINUED | OUTPATIENT
Start: 2025-04-19 | End: 2025-04-21 | Stop reason: HOSPADM

## 2025-04-19 RX ORDER — METHOCARBAMOL 750 MG/1
750 TABLET, FILM COATED ORAL EVERY 6 HOURS PRN
Qty: 60 TABLET | Refills: 0 | Status: SHIPPED | OUTPATIENT
Start: 2025-04-19

## 2025-04-19 RX ORDER — OXYCODONE HYDROCHLORIDE 5 MG/1
5 TABLET ORAL EVERY 4 HOURS PRN
Qty: 30 TABLET | Refills: 0 | Status: SHIPPED | OUTPATIENT
Start: 2025-04-19

## 2025-04-19 RX ORDER — SODIUM CHLORIDE 9 MG/ML
INJECTION, SOLUTION INTRAVENOUS CONTINUOUS
Status: DISCONTINUED | OUTPATIENT
Start: 2025-04-19 | End: 2025-04-20

## 2025-04-19 RX ADMIN — FERROUS SULFATE TAB 325 MG (65 MG ELEMENTAL FE) 325 MG: 325 (65 FE) TAB at 08:49

## 2025-04-19 RX ADMIN — SODIUM CHLORIDE: 9 INJECTION, SOLUTION INTRAVENOUS at 12:20

## 2025-04-19 RX ADMIN — DONEPEZIL HYDROCHLORIDE 10 MG: 10 TABLET ORAL at 08:47

## 2025-04-19 RX ADMIN — ATORVASTATIN CALCIUM 40 MG: 40 TABLET, FILM COATED ORAL at 08:47

## 2025-04-19 RX ADMIN — METOPROLOL TARTRATE 25 MG: 25 TABLET, FILM COATED ORAL at 22:11

## 2025-04-19 RX ADMIN — GLIPIZIDE 5 MG: 5 TABLET, FILM COATED, EXTENDED RELEASE ORAL at 08:50

## 2025-04-19 RX ADMIN — EMPAGLIFLOZIN 10 MG: 10 TABLET, FILM COATED ORAL at 08:47

## 2025-04-19 RX ADMIN — CEFAZOLIN SODIUM 2 G: 2 SOLUTION INTRAVENOUS at 00:51

## 2025-04-19 RX ADMIN — TAMSULOSIN HYDROCHLORIDE 0.4 MG: 0.4 CAPSULE ORAL at 08:47

## 2025-04-19 RX ADMIN — ACETAMINOPHEN 975 MG: 325 TABLET, FILM COATED ORAL at 00:51

## 2025-04-19 RX ADMIN — CEFAZOLIN SODIUM 2 G: 2 SOLUTION INTRAVENOUS at 16:13

## 2025-04-19 RX ADMIN — CEFAZOLIN SODIUM 2 G: 2 SOLUTION INTRAVENOUS at 09:56

## 2025-04-19 RX ADMIN — LOSARTAN POTASSIUM: 50 TABLET, FILM COATED ORAL at 08:48

## 2025-04-19 RX ADMIN — SODIUM CHLORIDE 500 ML: 9 INJECTION, SOLUTION INTRAVENOUS at 10:30

## 2025-04-19 RX ADMIN — MEMANTINE 10 MG: 5 TABLET ORAL at 19:37

## 2025-04-19 RX ADMIN — SENNOSIDES AND DOCUSATE SODIUM 1 TABLET: 50; 8.6 TABLET ORAL at 08:44

## 2025-04-19 RX ADMIN — MEMANTINE 10 MG: 5 TABLET ORAL at 08:49

## 2025-04-19 RX ADMIN — ACETAMINOPHEN 975 MG: 325 TABLET, FILM COATED ORAL at 16:12

## 2025-04-19 RX ADMIN — ACETAMINOPHEN 975 MG: 325 TABLET, FILM COATED ORAL at 08:44

## 2025-04-19 RX ADMIN — ACETAMINOPHEN 975 MG: 325 TABLET, FILM COATED ORAL at 23:21

## 2025-04-19 RX ADMIN — Medication 1 TABLET: at 08:47

## 2025-04-19 RX ADMIN — SENNOSIDES AND DOCUSATE SODIUM 1 TABLET: 50; 8.6 TABLET ORAL at 19:37

## 2025-04-19 RX ADMIN — POLYETHYLENE GLYCOL 3350 17 G: 17 POWDER, FOR SOLUTION ORAL at 08:43

## 2025-04-19 RX ADMIN — CEFAZOLIN SODIUM 2 G: 2 SOLUTION INTRAVENOUS at 23:21

## 2025-04-19 ASSESSMENT — ACTIVITIES OF DAILY LIVING (ADL)
ADLS_ACUITY_SCORE: 30
ADLS_ACUITY_SCORE: 42
ADLS_ACUITY_SCORE: 42
ADLS_ACUITY_SCORE: 37
ADLS_ACUITY_SCORE: 33
ADLS_ACUITY_SCORE: 33
ADLS_ACUITY_SCORE: 30
ADLS_ACUITY_SCORE: 30
ADLS_ACUITY_SCORE: 42
ADLS_ACUITY_SCORE: 33
ADLS_ACUITY_SCORE: 30
ADLS_ACUITY_SCORE: 42
ADLS_ACUITY_SCORE: 32
ADLS_ACUITY_SCORE: 32
ADLS_ACUITY_SCORE: 33
ADLS_ACUITY_SCORE: 31
ADLS_ACUITY_SCORE: 30
ADLS_ACUITY_SCORE: 42
ADLS_ACUITY_SCORE: 33
ADLS_ACUITY_SCORE: 33
ADLS_ACUITY_SCORE: 42

## 2025-04-19 NOTE — PROGRESS NOTES
"[facility]  Neurosurgery Progress Note  Balaji Sommers MD       1 Day Post-Op  Procedure(s):  Bilateral lumbar 4 to lumbar 5 decompression and fusion with navigation    Interval history: Doing well. Post op pain as expected. Minimal mobilization at this point.       Vital signs:   Blood pressure (!) 105/34, pulse 76, temperature 98.6  F (37  C), temperature source Temporal, resp. rate 16, height 1.676 m (5' 6\"), weight 85.1 kg (187 lb 9.8 oz), SpO2 98%.        Exam:   Stable  Lab Results   Component Value Date    HGB 11.3 04/19/2025    HGB 13.7 01/11/2021     Lab Results   Component Value Date    HCT 38.5 07/11/2023     No components found for: \"MCT\"  Lab Results   Component Value Date    MCV 95 07/11/2023     Lab Results   Component Value Date    MCH 32.2 07/11/2023     Lab Results   Component Value Date    MCHC 34.0 07/11/2023     Lab Results   Component Value Date    RDW 12.5 07/11/2023     Lab Results   Component Value Date     07/11/2023       Last Basic Metabolic Panel:  Lab Results   Component Value Date     04/19/2025    .4 01/11/2021      Lab Results   Component Value Date    POTASSIUM 3.8 04/19/2025    POTASSIUM 4.57 01/11/2021     Lab Results   Component Value Date    CHLORIDE 107 04/19/2025    CHLORIDE 103.3 01/11/2021     Lab Results   Component Value Date    ABIMBOLA 8.5 04/19/2025    ABIMBOLA 9.4 01/11/2021     Lab Results   Component Value Date    CO2 23 04/19/2025    CO2 28.2 01/11/2021     Lab Results   Component Value Date    BUN 21.9 04/19/2025    BUN 24 01/11/2021    BUN 21.8 01/11/2021     Lab Results   Component Value Date    CR 1.06 04/19/2025    CR 1.10 01/11/2021     Lab Results   Component Value Date     04/19/2025     04/19/2025     01/11/2021       No results found for: \"INR\"    A/P:   1 Day Post-Op  Procedure(s):  Bilateral lumbar 4 to lumbar 5 decompression and fusion with navigation    Plan:    Doing well  Mobilize with PT  OOB to chair " as tolerated   Continue pain control   Drain to gravity suction  May remove UMER drain when output < 30 ml  Home Monday      Balaji Sommers MD, MS, FAANS  [facility]  [phone] (office)  [phone] (Care coordinator)

## 2025-04-19 NOTE — PLAN OF CARE
Goal Outcome Evaluation:      Plan of Care Reviewed With: patient    Overall Patient Progress: improvingOverall Patient Progress: improving    Outcome Evaluation: A&Ox3. Disoriented to time. Assist x 1 with gait belt and walker. On RA. Pt was up in the recliner in the morning after PT. Pt felt lightheaded, weak, was sweating, R arm shaking and c/o headache. His wife called for help and RRT was activated at 1030. BP at that time was 83/60. HR 66. Bolus 500 cc ordered by provider. Rechecked /56. HR 78. BP medicaton (Hazaar) held by provider. . 3 Units of sliding scale insulin given. EKG done. Troponin 29. Magnesium 1.8. Now feeling better per pt. voided. Laying comfortably in bed. Call light within reach. wife at bedtime.      Problem: Delirium  Goal: Optimal Coping  Outcome: Progressing  Intervention: Optimize Psychosocial Adjustment to Delirium  Recent Flowsheet Documentation  Taken 4/19/2025 0836 by Brittnee Peters, RN  Family/Support System Care:   involvement promoted   support provided  Goal: Improved Behavioral Control  Outcome: Progressing  Intervention: Minimize Safety Risk  Recent Flowsheet Documentation  Taken 4/19/2025 0836 by Brittnee Peters, RN  Enhanced Safety Measures:   pain management   room near unit station  Trust Relationship/Rapport: care explained  Goal: Improved Attention and Thought Clarity  Outcome: Progressing  Goal: Improved Sleep  Outcome: Progressing

## 2025-04-19 NOTE — CONSULTS
[facility]    Hospitalist Consultation    Date of Admission:  4/18/2025  Date of Consult (When I saw the patient): 04/19/25    Assessment & Plan   Aramis Flores is a 84 year old male patient with past medical history of degenerative joint disease, diabetes mellitus type 2, hypertension, Alzheimer's disease, who was admitted for an elective surgery.  Patient underwent bilateral lumbar 4-5 decompression and fusion with application.  Hospitalist team was consulted for postop medical management.   Patient had episode of lightheadedness and near syncope while sitting in chair.  Rapid response was called and he was immediately evaluated.  Patient was noted to have blood pressure with systolic BP of 87.  He was given IV fluid bolus.  Patient's blood pressure immediately improved.  His symptoms also resolved.    Presyncope  Hypotension  Patient was sitting in chair and felt lightheaded and almost passed out.  On exam, he had low BP.  He was given normal saline 500 mL bolus.  He was put on normal saline at 100 mL/h.  Hemoglobin 11.2.    Reportedly patient received his Hyzaar dose this morning.  Not drinking enough fluids.  EKG unremarkable for acute ischemia.  Troponin 29.  - Will continue normal saline at 100 mL/h  - Hold Hyzaar  - Will monitor on telemetry  - Will closely monitor vital signs    Status post bilateral lumbar 4-5 decompression and fusion  Spine surgery following  -PT/OT/DVT prophylaxis per primary team    Diabetes mellitus type 2  Blood sugar 265  - Will continue insulin sliding scale  - Already restarted on glipizide, Jardiance  - Will monitor blood sugar.    History of BPH  - Will continue tamsulosin    Dementia  - Will continue Aricept    DVT Prophylaxis: Defer to primary service  Code Status: Full Code    Disposition: Medically Ready for Discharge: Defer to primary service    Bere Nobles MD    Reason for Consult   Reason for consult: Postop medical management    Primary  Care Physician   LAUREN OQUENDO    Chief Complaint   Postop management    History is obtained from the patient    History of Present Illness   Aramis Flores is a 84 year old male patient with past medical history of degenerative joint disease, diabetes mellitus type 2, hypertension, Alzheimer's disease, who was admitted for an elective surgery.  Patient underwent bilateral lumbar 4-5 decompression and fusion with application.  Hospitalist team was consulted for postop medical management.  Patient had episode of lightheadedness while sitting in chair.  Rapid response was called.  On my arrival, patient was hypotensive with systolic blood pressure in 80s.  Patient was given IV fluid bolus and blood pressure immediately improved.  He denies pain.  Chart review, patient had his Hyzaar dose this morning.      Past Medical History    I have reviewed this patient's medical history and updated it with pertinent information if needed.   Past Medical History:   Diagnosis Date    Alzheimer's disease (H) 05/2024    Degenerative joint disease     Diabetes (H)     Hypertension     Rosacea        Past Surgical History   I have reviewed this patient's surgical history and updated it with pertinent information if needed.  Past Surgical History:   Procedure Laterality Date    ARTHROPLASTY KNEE Left 3/7/2019    Procedure: LEFT TOTAL KNEE ARTHROPLASTY;  Surgeon: Mario Boland MD;  Location: [facility] OR    CATARACT IOL, RT/LT      HERNIA REPAIR         Prior to Admission Medications   Prior to Admission Medications   Prescriptions Last Dose Informant Patient Reported? Taking?   aspirin 81 MG EC tablet Past Week  Yes Yes   Sig: Take 81 mg by mouth daily.   atorvastatin (LIPITOR) 40 MG tablet 4/17/2025 Self Yes Yes   Sig: Take 40 mg by mouth daily    blood glucose monitoring (NO BRAND SPECIFIED) test strip 4/17/2025 Self Yes Yes   Sig: USE TO TEST ONCE TO TWICE DAILY   blood glucose monitoring (SOFTCLIX) lancets 4/17/2025 Self Yes Yes    Sig: USE TO TEST ONCE TO TWICE DAILY   calcium carbonate 600 mg-vitamin D 400 units (CALTRATE) 600-400 MG-UNIT per tablet Past Week Self Yes Yes   Sig: Take 1 tablet by mouth daily   cyclobenzaprine (FLEXERIL) 10 MG tablet 4/17/2025  Yes Yes   Sig: Take 10 mg by mouth nightly as needed.   donepezil (ARICEPT) 10 MG tablet 4/17/2025  Yes Yes   Sig: Take 10 mg by mouth daily.   empagliflozin (JARDIANCE) 10 MG TABS tablet Past Week  Yes Yes   Sig: Take 10 mg by mouth daily.   ferrous sulfate (FEROSUL) 325 (65 Fe) MG tablet Past Week  Yes Yes   Sig: Take 325 mg by mouth daily (with breakfast).   glipiZIDE (GLUCOTROL XL) 5 MG 24 hr tablet 4/17/2025 Self Yes Yes   Sig: Take 5 mg by mouth every morning.   losartan-hydrochlorothiazide (HYZAAR) 100-12.5 MG tablet 4/17/2025  Yes Yes   Sig: Take 1 tablet by mouth daily.   memantine (NAMENDA) 10 MG tablet 4/17/2025  Yes Yes   Sig: Take 10 mg by mouth 2 times daily.   metFORMIN (GLUCOPHAGE) 1000 MG tablet 4/17/2025 Morning Self Yes Yes   Sig: Take 1,000 mg by mouth daily (with breakfast).   metFORMIN (GLUCOPHAGE) 500 MG tablet 4/17/2025 Evening  Yes Yes   Sig: Take 500 mg by mouth daily (with dinner).   minocycline (MINOCIN) 100 MG capsule 4/17/2025  Yes Yes   Sig: Take 100 mg by mouth daily.   multivitamin (CENTRUM SILVER) tablet Past Week Self Yes Yes   Sig: Take 1 tablet by mouth daily   tamsulosin (FLOMAX) 0.4 MG capsule 4/17/2025  Yes Yes   Sig: Take 1 capsule by mouth daily.   vitamin B-12 (CYANOCOBALAMIN) 1000 MCG tablet Past Week  Yes Yes   Sig: Take 1,000 mcg by mouth      Facility-Administered Medications: None     Allergies   Allergies   Allergen Reactions    Doxycycline Swelling       Social History   I have reviewed this patient's social history and updated it with pertinent information if needed. Aramis Flores  reports that he quit smoking about 56 years ago. His smoking use included cigarettes. He has never used smokeless tobacco. He reports that he does  not use drugs.    Family History   I have reviewed this patient's family history and updated it with pertinent information if needed.   Family History   Problem Relation Age of Onset    Glaucoma No family hx of     Macular Degeneration No family hx of        Review of Systems   The 10 point Review of Systems is negative other than noted in the HPI or here.  Presyncope    Physical Exam   Temp: 97.2  F (36.2  C) Temp src: Oral BP: 136/56 Pulse: 78   Resp: 18 SpO2: 99 % O2 Device: None (Room air) Oxygen Delivery: 2 LPM  Vital Signs with Ranges  Temp:  [97.2  F (36.2  C)-98.6  F (37  C)] 97.2  F (36.2  C)  Pulse:  [] 78  Resp:  [8-23] 18  BP: ()/(34-82) 136/56  SpO2:  [90 %-100 %] 99 %  187 lbs 9.78 oz    GEN:  Alert, oriented x 2, not in acute distress  HEENT:  Normocephalic/atraumatic, no scleral icterus, no nasal discharge, mouth moist.  CV:  Regular rate and rhythm, no murmur or JVD.  S1 + S2 noted, no S3 or S4.  LUNGS:  Clear to auscultation bilaterally without rales/rhonchi/wheezing/retractions.  Symmetric chest rise on inhalation noted.  ABD:  Active bowel sounds, soft, non-tender/non-distended.  No rebound/guarding/rigidity.  EXT:  No edema or cyanosis.  Hands/feet warm to touch with good signs of peripheral perfusion.  No joint synovitis noted.  SKIN:  Dry to touch, no exanthems noted in the visualized areas.  NEURO:  Symmetric muscle strength, sensation to touch grossly intact.  No new focal deficits appreciated.    Data   -Data reviewed today: All pertinent laboratory and imaging results from this encounter were reviewed. I personally reviewed   Recent Labs   Lab 04/19/25  1040 04/19/25  0800 04/19/25  0632   HGB  --   --  11.3*   NA  --   --  142   POTASSIUM  --   --  3.8   CHLORIDE  --   --  107   CO2  --   --  23   BUN  --   --  21.9   CR  --   --  1.06   ANIONGAP  --   --  12   ABIMBOLA  --   --  8.5*   * 171* 145*       Recent Results (from the past 24 hours)   XR Surgery JOYCE L/T 5 Min  Fluoro w Stills    Narrative    This exam was marked as non-reportable because it will not be read by a   radiologist or a [facility] non-radiologist provider.

## 2025-04-19 NOTE — PLAN OF CARE
Goal Outcome Evaluation:      Plan of Care Reviewed With: patient    Overall Patient Progress: improvingOverall Patient Progress: improving     A&Ox3, RA, VSS, X2 walker GB with back brace on. Continuous fluids. ACHS BS. Urinal in use. Pending discharge plans    1935: 17 seconds of accelerated idioventricular rhythm.- notified MD. No new orders   2117: 28 seconds of accelerated idioventricular rhythm. Notified crosscover. Asked if writer should notify crosscover for every episode. Cross[employer] stated that  they did not need to be informed everytime for Accelerated idioventricular rhythm- call for other new rhythms Metoprolol ordered.   2158: sustaining accelerated idioventricular rhythm for a couple minutes now per tele.     Problem: Delirium  Goal: Optimal Coping  Outcome: Progressing  Goal: Improved Behavioral Control  Outcome: Progressing  Intervention: Minimize Safety Risk  Recent Flowsheet Documentation  Taken 4/19/2025 1827 by Sharlene Anderson, RN  Enhanced Safety Measures:   pain management   review medications for side effects with activity  Goal: Improved Attention and Thought Clarity  Outcome: Progressing  Goal: Improved Sleep  Outcome: Progressing     Problem: Adult Inpatient Plan of Care  Goal: Plan of Care Review  Outcome: Progressing  Flowsheets (Taken 4/19/2025 1846)  Plan of Care Reviewed With: patient  Overall Patient Progress: improving  Goal: Patient-Specific Goal (Individualized)  Outcome: Progressing  Goal: Absence of Hospital-Acquired Illness or Injury  Outcome: Progressing  Intervention: Identify and Manage Fall Risk  Recent Flowsheet Documentation  Taken 4/19/2025 1827 by Sharlene Anderson, RN  Safety Promotion/Fall Prevention:   activity supervised   assistive device/personal items within reach   safety round/check completed   room organization consistent   room near nurse's station  Intervention: Prevent and Manage VTE (Venous Thromboembolism) Risk  Recent Flowsheet Documentation  Taken  4/19/2025 1827 by Sharlene Anderson RN  VTE Prevention/Management: SCDs on (sequential compression devices)  Intervention: Prevent Infection  Recent Flowsheet Documentation  Taken 4/19/2025 1827 by Sharlene Anderson RN  Infection Prevention:   hand hygiene promoted   rest/sleep promoted  Goal: Optimal Comfort and Wellbeing  Outcome: Progressing  Goal: Readiness for Transition of Care  Outcome: Progressing  Intervention: Mutually Develop Transition Plan  Recent Flowsheet Documentation  Taken 4/19/2025 1708 by Sharlene Anderson RN  Equipment Currently Used at Home:   walker, rolling   shower chair   grab bar, tub/shower     Problem: Pain Acute  Goal: Optimal Pain Control and Function  Outcome: Progressing  Intervention: Prevent or Manage Pain  Recent Flowsheet Documentation  Taken 4/19/2025 1827 by Sharlene Anderson RN  Medication Review/Management: medications reviewed     Problem: Spinal Surgery  Goal: Optimal Coping with Surgery  Outcome: Progressing  Goal: Absence of Bleeding  Outcome: Progressing  Goal: Effective Bowel Elimination  Outcome: Progressing  Goal: Fluid and Electrolyte Balance  Outcome: Progressing  Goal: Optimal Functional Ability  Outcome: Progressing  Intervention: Optimize Functional Status  Recent Flowsheet Documentation  Taken 4/19/2025 1827 by Sharlene Anderson RN  Activity Management: activity adjusted per tolerance  Positioning/Transfer Devices:   pillows   in use  Goal: Absence of Infection Signs and Symptoms  Outcome: Progressing  Intervention: Prevent or Manage Infection  Recent Flowsheet Documentation  Taken 4/19/2025 1827 by Sharlene Anderson RN  Infection Prevention:   hand hygiene promoted   rest/sleep promoted  Goal: Optimal Neurologic Function  Outcome: Progressing  Goal: Anesthesia/Sedation Recovery  Outcome: Progressing  Intervention: Optimize Anesthesia Recovery  Recent Flowsheet Documentation  Taken 4/19/2025 1827 by Sharlene Anderson RN  Safety Promotion/Fall Prevention:   activity  supervised   assistive device/personal items within reach   safety round/check completed   room organization consistent   room near nurse's station  Goal: Optimal Pain Control and Function  Outcome: Progressing  Goal: Nausea and Vomiting Relief  Outcome: Progressing  Goal: Effective Urinary Elimination  Outcome: Progressing  Goal: Effective Oxygenation and Ventilation  Outcome: Progressing  Intervention: Optimize Oxygenation and Ventilation  Recent Flowsheet Documentation  Taken 4/19/2025 4780 by Sharlene Anderson, RN  Head of Bed (HOB) Positioning: HOB at 20-30 degrees

## 2025-04-19 NOTE — PROGRESS NOTES
04/19/25 0842   Appointment Info   Signing Clinician's Name / Credentials (PT) Yoselin Aiken DPT   Living Environment   People in Home significant other   Current Living Arrangements house   Home Accessibility stairs within home;stairs to enter home   Number of Stairs, Main Entrance 6  (To come into home from garage)   Stair Railings, Main Entrance railings on both sides of stairs   Number of Stairs, Within Home, Primary seven  (to access main level living)   Stair Railings, Within Home, Primary railings on both sides of stairs   Transportation Anticipated family or friend will provide   Living Environment Comments Patient lives with retired spouse in split level home   Self-Care   Usual Activity Tolerance moderate   Current Activity Tolerance moderate   Equipment Currently Used at Home cane, straight;walker, rolling   Activity/Exercise/Self-Care Comment Patient reports using a walker with ambulation for several months prior to surgery.  Has a 4WW for community mobility, 2WW for in home mobliity.  At baseline had right LE radicular symptoms to the knee   General Information   Onset of Illness/Injury or Date of Surgery 04/18/25   Referring Physician Balaji Sommers MD   Patient/Family Therapy Goals Statement (PT) decrease pain, return to home   Pertinent History of Current Problem (include personal factors and/or comorbidities that impact the POC) Per medical chart: Patient seen POD #1 s/p Bilateral lumbar 4 to lumbar 5 decompression and fusion with navigation.   Existing Precautions/Restrictions fall;spinal   Cognition   Orientation Status (Cognition) oriented x 4   Pain Assessment   Patient Currently in Pain Yes, see Vital Sign flowsheet   Integumentary/Edema   Integumentary/Edema Comments incision not visualized during session   Posture    Posture Protracted shoulders;Forward head position   Range of Motion (ROM)   Range of Motion ROM deficits secondary to pain;ROM deficits secondary to surgical  procedure   Strength (Manual Muscle Testing)   Strength (Manual Muscle Testing) Deficits observed during functional mobility   Bed Mobility   Comment, (Bed Mobility) Log roll with verbal cueing and environmental set up   Transfers   Transfers sit-stand transfer   Sit-Stand Transfer   Sit-Stand [place] (Transfers) contact guard;verbal cues   Assistive Device (Sit-Stand Transfers) walker, front-wheeled   Gait/Stairs (Locomotion)   [place] Level (Gait) contact guard   Assistive Device (Gait) walker, front-wheeled   Distance in Feet (Gait) 5 (eval)+40 (treat)   Balance   Balance Comments Patient currently requires walker with ambulation secondary to reports of unsteadiness with gait   Sensory Examination   Sensory Perception Comments Reports no change in sensation   Coordination   Coordination no deficits were identified   Clinical Impression   Criteria for Skilled Therapeutic Intervention Yes, treatment indicated   PT Diagnosis (PT) Impaired functional mobility   Influenced by the following impairments pain, decreased strength, activity tolerance, balance   Functional limitations due to impairments difficulties with transfers and ambulation   Clinical Presentation (PT Evaluation Complexity) stable   Clinical Presentation Rationale clinical judgement   Clinical Decision Making (Complexity) low complexity   Planned Therapy Interventions (PT) balance training;bed mobility training;gait training;patient/family education;strengthening;transfer training;progressive activity/exercise;ROM (range of motion);stair training;home program guidelines   Risk & Benefits of therapy have been explained evaluation/treatment results reviewed;care plan/treatment goals reviewed;risks/benefits reviewed;current/potential barriers reviewed;participants included;patient;participants voiced agreement with care plan   PT Total Evaluation Time   PT Eval, Low Complexity Minutes (10052) 10   Physical Therapy Goals   PT Frequency Daily   PT  Predicted Duration/Target Date for Goal Attainment 04/21/25   PT Goals Transfers;Gait;Aerobic Activity;Stairs;Bed Mobility   PT: Bed Mobility Supervision/stand-by assist;Supine to/from sit;Within precautions   PT: Transfers Sit to/from stand;Bed to/from chair;Assistive device;Supervision/stand-by assist;Within precautions   PT: Gait Assistive device;Supervision/stand-by assist;150 feet;Within precautions   PT: Stairs Supervision/stand-by assist;Assistive device;Within precautions;4 stairs;Rail on right   Interventions   Interventions Quick Adds Therapeutic Activity;Gait Training   Therapeutic Activity   Therapeutic Activities: dynamic activities to improve functional performance Minutes (62486) 15   Symptoms Noted During/After Treatment Fatigue;Increased pain   Treatment Detail/Skilled Intervention Patient supine upon arrival.  Patient educated in spinal precautions.  Facilitated bed mobility including transfers between supine and sitting through log roll with verbal cueing for technique.  Patient donned back brace with min A and verbal cueing.  Facilitated transfers between sitting and standing with 2WW and graded assist from CGA to SBA. Facilitated use of urinal at bedside with CGA for stabilization.  Facilitated transfer to bedside chair with verbal cueing and environmental set up. Patient at bedside chair at end of session with all needs within reach, chair alarm on.   Gait Training   Gait Training Minutes (87003) 8   Symptoms Noted During/After Treatment (Gait Training) fatigue;increased pain   Treatment Detail/Skilled Intervention Facilitated ambulation 40 feet with 2WW and graded assist from CGA to SBA.  Patient with decreased gait speed, fair foot clearance, increased reports of pain during gait; cueing provided for upright posture and to decrease reliance on UE support at walker.  Reported mild unsteadiness (close to baseline), no increase in pain, no lightheadedness, requested to stay within room during  this session in order to finish breakfast before getting pain meds (gets nauseous with pain meds)   Distance in Feet 40   PT Discharge Planning   PT Plan progress amb distance, stairs, bed mobility   PT Discharge Recommendation (DC Rec) home with assist   PT Rationale for DC Rec Anticipate that with ongoing medical management and skilled IP PT, patient will demonstrate functional, household mobility with SBA and least restrictive assistive device   PT Brief overview of current status Ax1 with transfers and gait with 2WW   PT Total Distance Amb During Session (feet) 45   Physical Therapy Time and Intention   Timed Code Treatment Minutes 23   Total Session Time (sum of timed and untimed services) 33

## 2025-04-19 NOTE — PLAN OF CARE
"Goal Outcome Evaluation:      Plan of Care Reviewed With: patient    Overall Patient Progress: no changeOverall Patient Progress: no change         1596-1605  Intermittent confusion overnight. RA. . Brace on oob. UMER drain inplace 80 ml output overnight   Minimal pain overnight. Ancef infused      Problem: Delirium  Goal: Optimal Coping  Outcome: Progressing  Goal: Improved Behavioral Control  Outcome: Progressing  Intervention: Minimize Safety Risk  Recent Flowsheet Documentation  Taken 4/19/2025 0051 by Jose F Durbin RN  Enhanced Safety Measures:   pain management   review medications for side effects with activity  Goal: Improved Attention and Thought Clarity  Outcome: Progressing  Goal: Improved Sleep  Outcome: Progressing     Problem: Adult Inpatient Plan of Care  Goal: Plan of Care Review  Description: The Plan of Care Review/Shift note should be completed every shift.  The Outcome Evaluation is a brief statement about your assessment that the patient is improving, declining, or no change.  This information will be displayed automatically on your shiftnote.  Outcome: Progressing  Flowsheets (Taken 4/19/2025 0318)  Plan of Care Reviewed With: patient  Overall Patient Progress: no change  Goal: Patient-Specific Goal (Individualized)  Description: You can add care plan individualizations to a care plan. Examples of Individualization might be:  \"Parent requests to be called daily at 9am for status\", \"I have a hard time hearing out of my right ear\", or \"Do not touch me to wake me up as it startlesme\".  Outcome: Progressing  Goal: Absence of Hospital-Acquired Illness or Injury  Outcome: Progressing  Intervention: Identify and Manage Fall Risk  Recent Flowsheet Documentation  Taken 4/19/2025 0051 by Jose F Durbin, RN  Safety Promotion/Fall Prevention:   activity supervised   assistive device/personal items within reach   safety round/check completed   room organization consistent   room near nurse's " station  Intervention: Prevent Skin Injury  Recent Flowsheet Documentation  Taken 4/19/2025 0051 by Jose F Durbin RN  Body Position: position changed independently  Intervention: Prevent and Manage VTE (Venous Thromboembolism) Risk  Recent Flowsheet Documentation  Taken 4/19/2025 0051 by Jose F Durbin RN  VTE Prevention/Management: SCDs on (sequential compression devices)  Intervention: Prevent Infection  Recent Flowsheet Documentation  Taken 4/19/2025 0051 by Jose F Durbin RN  Infection Prevention:   rest/sleep promoted   single patient room provided  Goal: Optimal Comfort and Wellbeing  Outcome: Progressing  Goal: Readiness for Transition of Care  Outcome: Progressing

## 2025-04-19 NOTE — PROVIDER NOTIFICATION
Page sent to Dr. Nobles- Notified by tele that pt had 24 seconds of V-Tach. Upon assessment, pt resting in bed eating, no distress or symptoms. VSS. Now back in normal rhythm. Any further orders?  Response back to keep monitoring on tele and notify if recurrent episodes.

## 2025-04-20 ENCOUNTER — APPOINTMENT (OUTPATIENT)
Dept: OCCUPATIONAL THERAPY | Facility: CLINIC | Age: 85
DRG: 451 | End: 2025-04-20
Attending: NEUROLOGICAL SURGERY
Payer: MEDICARE

## 2025-04-20 ENCOUNTER — APPOINTMENT (OUTPATIENT)
Dept: PHYSICAL THERAPY | Facility: CLINIC | Age: 85
DRG: 451 | End: 2025-04-20
Attending: NEUROLOGICAL SURGERY
Payer: MEDICARE

## 2025-04-20 LAB
GLUCOSE BLDC GLUCOMTR-MCNC: 149 MG/DL (ref 70–99)
GLUCOSE BLDC GLUCOMTR-MCNC: 176 MG/DL (ref 70–99)
GLUCOSE BLDC GLUCOMTR-MCNC: 200 MG/DL (ref 70–99)
GLUCOSE BLDC GLUCOMTR-MCNC: 205 MG/DL (ref 70–99)
GLUCOSE BLDC GLUCOMTR-MCNC: 207 MG/DL (ref 70–99)
HGB BLD-MCNC: 11.2 G/DL (ref 13.3–17.7)
MAGNESIUM SERPL-MCNC: 2 MG/DL (ref 1.7–2.3)
POTASSIUM SERPL-SCNC: 3.9 MMOL/L (ref 3.4–5.3)

## 2025-04-20 PROCEDURE — 250N000011 HC RX IP 250 OP 636: Performed by: NEUROLOGICAL SURGERY

## 2025-04-20 PROCEDURE — 36415 COLL VENOUS BLD VENIPUNCTURE: CPT | Performed by: INTERNAL MEDICINE

## 2025-04-20 PROCEDURE — 99232 SBSQ HOSP IP/OBS MODERATE 35: CPT | Performed by: INTERNAL MEDICINE

## 2025-04-20 PROCEDURE — 250N000013 HC RX MED GY IP 250 OP 250 PS 637: Performed by: NEUROLOGICAL SURGERY

## 2025-04-20 PROCEDURE — 84132 ASSAY OF SERUM POTASSIUM: CPT | Performed by: INTERNAL MEDICINE

## 2025-04-20 PROCEDURE — 97530 THERAPEUTIC ACTIVITIES: CPT | Mod: GP

## 2025-04-20 PROCEDURE — 97116 GAIT TRAINING THERAPY: CPT | Mod: GP

## 2025-04-20 PROCEDURE — 85018 HEMOGLOBIN: CPT | Performed by: INTERNAL MEDICINE

## 2025-04-20 PROCEDURE — 258N000003 HC RX IP 258 OP 636: Performed by: INTERNAL MEDICINE

## 2025-04-20 PROCEDURE — 250N000013 HC RX MED GY IP 250 OP 250 PS 637: Performed by: INTERNAL MEDICINE

## 2025-04-20 PROCEDURE — 83036 HEMOGLOBIN GLYCOSYLATED A1C: CPT | Performed by: NEUROLOGICAL SURGERY

## 2025-04-20 PROCEDURE — 97535 SELF CARE MNGMENT TRAINING: CPT | Mod: GO | Performed by: OCCUPATIONAL THERAPIST

## 2025-04-20 PROCEDURE — 120N000001 HC R&B MED SURG/OB

## 2025-04-20 PROCEDURE — 83735 ASSAY OF MAGNESIUM: CPT | Performed by: INTERNAL MEDICINE

## 2025-04-20 RX ADMIN — METHOCARBAMOL 750 MG: 500 TABLET ORAL at 17:01

## 2025-04-20 RX ADMIN — DONEPEZIL HYDROCHLORIDE 10 MG: 10 TABLET ORAL at 08:36

## 2025-04-20 RX ADMIN — METOPROLOL TARTRATE 25 MG: 25 TABLET, FILM COATED ORAL at 20:49

## 2025-04-20 RX ADMIN — SENNOSIDES AND DOCUSATE SODIUM 1 TABLET: 50; 8.6 TABLET ORAL at 08:35

## 2025-04-20 RX ADMIN — METHOCARBAMOL 750 MG: 500 TABLET ORAL at 08:36

## 2025-04-20 RX ADMIN — POLYETHYLENE GLYCOL 3350 17 G: 17 POWDER, FOR SOLUTION ORAL at 08:35

## 2025-04-20 RX ADMIN — GLIPIZIDE 5 MG: 5 TABLET, FILM COATED, EXTENDED RELEASE ORAL at 08:36

## 2025-04-20 RX ADMIN — ACETAMINOPHEN 975 MG: 325 TABLET, FILM COATED ORAL at 08:37

## 2025-04-20 RX ADMIN — MEMANTINE 10 MG: 5 TABLET ORAL at 20:48

## 2025-04-20 RX ADMIN — MEMANTINE 10 MG: 5 TABLET ORAL at 08:36

## 2025-04-20 RX ADMIN — CEFAZOLIN SODIUM 2 G: 2 SOLUTION INTRAVENOUS at 08:35

## 2025-04-20 RX ADMIN — ATORVASTATIN CALCIUM 40 MG: 40 TABLET, FILM COATED ORAL at 08:35

## 2025-04-20 RX ADMIN — SENNOSIDES AND DOCUSATE SODIUM 1 TABLET: 50; 8.6 TABLET ORAL at 20:48

## 2025-04-20 RX ADMIN — Medication 1 TABLET: at 08:35

## 2025-04-20 RX ADMIN — TAMSULOSIN HYDROCHLORIDE 0.4 MG: 0.4 CAPSULE ORAL at 08:36

## 2025-04-20 RX ADMIN — EMPAGLIFLOZIN 10 MG: 10 TABLET, FILM COATED ORAL at 08:36

## 2025-04-20 RX ADMIN — METOPROLOL TARTRATE 25 MG: 25 TABLET, FILM COATED ORAL at 08:38

## 2025-04-20 RX ADMIN — SODIUM CHLORIDE: 9 INJECTION, SOLUTION INTRAVENOUS at 08:43

## 2025-04-20 RX ADMIN — FERROUS SULFATE TAB 325 MG (65 MG ELEMENTAL FE) 325 MG: 325 (65 FE) TAB at 08:36

## 2025-04-20 RX ADMIN — ACETAMINOPHEN 975 MG: 325 TABLET, FILM COATED ORAL at 17:01

## 2025-04-20 ASSESSMENT — ACTIVITIES OF DAILY LIVING (ADL)
ADLS_ACUITY_SCORE: 42
ADLS_ACUITY_SCORE: 45
ADLS_ACUITY_SCORE: 42
ADLS_ACUITY_SCORE: 45

## 2025-04-20 NOTE — PROGRESS NOTES
X-cover    Called by nurse about idioventricular rhythm in this patient lasting approx 30 seconds.  Noted on tele monitor.  HR in the 90s when this occurs    Pt asymptomatic    Labs earlier today including mag and potassium unremarkable    Plan:  - continue tele monitoring  - start metoprolol 25 mg bid

## 2025-04-20 NOTE — PROGRESS NOTES
Continue pain control  No dizziness today  Continue to mobilize  UMER drain removed  Plan to discharge tomorrow

## 2025-04-20 NOTE — PLAN OF CARE
"Goal Outcome Evaluation:      Plan of Care Reviewed With: patient    Overall Patient Progress: no changeOverall Patient Progress: no change         [id number]  Intermittent confusion overnight. Minimal pain overnight. Voiding with urinal. Positions self well. IVF infusing  A1-2 with walker gb   No calls from tele overnight, per tele was Sr70s  Derek drain output from 2218 to 0615 10 ml    Problem: Delirium  Goal: Optimal Coping  Outcome: Progressing  Goal: Improved Behavioral Control  Outcome: Progressing  Intervention: Minimize Safety Risk  Recent Flowsheet Documentation  Taken 4/19/2025 2321 by Jose F Durbin, RN  Enhanced Safety Measures:   pain management   review medications for side effects with activity  Goal: Improved Attention and Thought Clarity  Outcome: Progressing  Goal: Improved Sleep  Outcome: Progressing     Problem: Adult Inpatient Plan of Care  Goal: Plan of Care Review  Description: The Plan of Care Review/Shift note should be completed every shift.  The Outcome Evaluation is a brief statement about your assessment that the patient is improving, declining, or no change.  This information will be displayed automatically on your shiftnote.  Outcome: Progressing  Flowsheets (Taken 4/20/2025 3769)  Plan of Care Reviewed With: patient  Overall Patient Progress: no change  Goal: Patient-Specific Goal (Individualized)  Description: You can add care plan individualizations to a care plan. Examples of Individualization might be:  \"Parent requests to be called daily at 9am for status\", \"I have a hard time hearing out of my right ear\", or \"Do not touch me to wake me up as it startlesme\".  Outcome: Progressing  Goal: Absence of Hospital-Acquired Illness or Injury  Outcome: Progressing  Intervention: Identify and Manage Fall Risk  Recent Flowsheet Documentation  Taken 4/19/2025 2321 by Jose F Durbin, RN  Safety Promotion/Fall Prevention:   activity supervised   assistive device/personal items within reach   " safety round/check completed   room organization consistent   room near nurse's station  Intervention: Prevent Skin Injury  Recent Flowsheet Documentation  Taken 4/19/2025 2321 by Jose F Durbin RN  Body Position: position changed independently  Intervention: Prevent and Manage VTE (Venous Thromboembolism) Risk  Recent Flowsheet Documentation  Taken 4/19/2025 2321 by Jose F Durbin RN  VTE Prevention/Management: SCDs on (sequential compression devices)  Intervention: Prevent Infection  Recent Flowsheet Documentation  Taken 4/19/2025 2321 by Jose F Durbin RN  Infection Prevention:   hand hygiene promoted   rest/sleep promoted  Goal: Optimal Comfort and Wellbeing  Outcome: Progressing  Goal: Readiness for Transition of Care  Outcome: Progressing     Problem: Pain Acute  Goal: Optimal Pain Control and Function  Outcome: Progressing  Intervention: Prevent or Manage Pain  Recent Flowsheet Documentation  Taken 4/19/2025 2321 by Jose F Durbin RN  Medication Review/Management: medications reviewed     Problem: Spinal Surgery  Goal: Optimal Coping with Surgery  Outcome: Progressing  Goal: Absence of Bleeding  Outcome: Progressing  Intervention: Monitor and Manage Bleeding  Recent Flowsheet Documentation  Taken 4/19/2025 2321 by Jose F Durbin RN  Bleeding Management: dressing monitored  Goal: Effective Bowel Elimination  Outcome: Progressing  Goal: Fluid and Electrolyte Balance  Outcome: Progressing  Goal: Optimal Functional Ability  Outcome: Progressing  Intervention: Optimize Functional Status  Recent Flowsheet Documentation  Taken 4/19/2025 2321 by Jose F Durbin RN  Activity Management: activity adjusted per tolerance  Positioning/Transfer Devices:   pillows   in use  Goal: Absence of Infection Signs and Symptoms  Outcome: Progressing  Intervention: Prevent or Manage Infection  Recent Flowsheet Documentation  Taken 4/19/2025 2321 by Jose F Durbin RN  Infection Prevention:   hand hygiene promoted    rest/sleep promoted  Goal: Optimal Neurologic Function  Outcome: Progressing  Intervention: Optimize Neurologic Function  Recent Flowsheet Documentation  Taken 4/19/2025 2321 by Jose F Durbin RN  Body Position: position changed independently  Goal: Anesthesia/Sedation Recovery  Outcome: Progressing  Intervention: Optimize Anesthesia Recovery  Recent Flowsheet Documentation  Taken 4/19/2025 2321 by Jose F Durbin, RN  Safety Promotion/Fall Prevention:   activity supervised   assistive device/personal items within reach   safety round/check completed   room organization consistent   room near nurse's station  Goal: Optimal Pain Control and Function  Outcome: Progressing  Goal: Nausea and Vomiting Relief  Outcome: Progressing  Goal: Effective Urinary Elimination  Outcome: Progressing  Goal: Effective Oxygenation and Ventilation  Outcome: Progressing

## 2025-04-20 NOTE — PROGRESS NOTES
[facility]    Hospitalist Progress Note  Provider : Bere Nobles MD, MD  Date of Service (when I saw the patient): 04/20/2025    Assessment & Plan   Aramis Flores is a 84 year old male patient with past medical history of degenerative joint disease, diabetes mellitus type 2, hypertension, Alzheimer's disease, who was admitted for an elective surgery.  Patient underwent bilateral lumbar 4-5 decompression and fusion with application.  Hospitalist team was consulted for postop medical management.   Patient had episode of lightheadedness and near syncope while sitting in chair.  Rapid response was called and he was immediately evaluated.  Patient was noted to have blood pressure with systolic BP of 87.  He was given IV fluid bolus. Patient's blood pressure immediately improved.  His symptoms also resolved.     Presyncope  Hypotension  Patient was sitting in chair and felt lightheaded and almost passed out.  On exam, he had low BP.  He was given normal saline 500 mL bolus.  He was put on normal saline at 100 mL/h.  Hemoglobin 11.2.    Reportedly patient received his Hyzaar dose this morning.  Not drinking enough fluids.  EKG unremarkable for acute ischemia.  Troponin 29.  - Improved with IV fluids Vitals stable. Discontinued IV fluids.   - hold Hyzaar  - will monitor on telemetry  - will closely monitor vital signs     Status post bilateral lumbar 4-5 decompression and fusion  Spine surgery following  -PT/OT/DVT prophylaxis per primary team     Diabetes mellitus type 2  -will continue insulin sliding scale  -continue glipizide, Jardiance  -monitor blood sugar.     History of BPH  -will continue tamsulosin     Dementia  -will continue Aricept     DVT Prophylaxis: Defer to primary service  Code Status: Full Code     Disposition: Medically Ready for Discharge: Defer to primary service     Bere Nobles MD    Interval History   Patient seen and examined today.  He states that he is  feeling much better today.  Sitting in chair.  Denies lightheadedness or dizziness today.  No shortness of breath.  No nausea or vomiting. Oral intake improving    -Data reviewed today: I reviewed all new labs and imaging results over the last 24 hours. I personally reviewed     Physical Exam   Temp: 98.2  F (36.8  C) Temp src: Temporal BP: (!) 145/80 Pulse: 70   Resp: 16 SpO2: 96 % O2 Device: None (Room air)    Vitals:    04/08/25 1300 04/18/25 0558 04/18/25 1606   Weight: 83.9 kg (185 lb) 85.4 kg (188 lb 4.8 oz) 85.1 kg (187 lb 9.8 oz)     Vital Signs with Ranges  Temp:  [97.8  F (36.6  C)-98.5  F (36.9  C)] 98.2  F (36.8  C)  Pulse:  [] 70  Resp:  [16-18] 16  BP: (139-157)/(58-80) 145/80  SpO2:  [96 %-97 %] 96 %  I/O last 3 completed shifts:  In: -   Out: 1047 [Urine:940; Drains:107]    GEN:  Alert, oriented x 3, appears comfortable, NAD.  HEENT:  Normocephalic/atraumatic, no scleral icterus, no nasal discharge, mouth moist.  CV:  Regular rate and rhythm, no murmur or JVD.  S1 + S2 noted, no S3 or S4.  LUNGS:  Clear to auscultation bilaterally without rales/rhonchi/wheezing/retractions.  Symmetric chest rise on inhalation noted.  ABD:  Active bowel sounds, soft, non-tender/non-distended.  No rebound/guarding/rigidity.  EXT:  No edema or cyanosis.  Hands/feet warm to touch with good signs of peripheral perfusion.  No joint synovitis noted.  SKIN:  Dry to touch, no exanthems noted in the visualized areas.  NEURO:  Symmetric muscle strength, sensation to touch grossly intact.  No new focal deficits appreciated.    Medications   Current Facility-Administered Medications   Medication Dose Route Frequency Provider Last Rate Last Admin     Current Facility-Administered Medications   Medication Dose Route Frequency Provider Last Rate Last Admin    acetaminophen (TYLENOL) tablet 975 mg  975 mg Oral Q8H Balaji Sommers MD   975 mg at 04/20/25 0837    atorvastatin (LIPITOR) tablet 40 mg  40 mg Oral Daily  Balaji Sommers MD   40 mg at 04/20/25 0835    ceFAZolin (ANCEF) 2 g in dextrose 50 mL intermittent infusion  2 g Intravenous Q8H Balaji Sommers MD   2 g at 04/20/25 0835    donepezil (ARICEPT) tablet 10 mg  10 mg Oral Daily Balaji Sommers MD   10 mg at 04/20/25 0836    empagliflozin (JARDIANCE) tablet 10 mg  10 mg Oral Daily Balaji Sommers MD   10 mg at 04/20/25 0836    ferrous sulfate (FEROSUL) tablet 325 mg  325 mg Oral Daily with breakfast Balaji Sommers MD   325 mg at 04/20/25 0836    glipiZIDE (GLUCOTROL XL) 24 hr tablet 5 mg  5 mg Oral QAM Balaji Sommers MD   5 mg at 04/20/25 0836    insulin aspart (NovoLOG) injection (RAPID ACTING)  1-7 Units Subcutaneous TID AC Mirian Barros PA-C   1 Units at 04/20/25 1011    insulin aspart (NovoLOG) injection (RAPID ACTING)  1-5 Units Subcutaneous At Bedtime Mirian Barros PA-C        [Held by provider] losartan-hydrochlorothiazide (HYZAAR) 100-12.5 mg combo dose   Oral Daily Balaji Sommers MD   Given at 04/19/25 0848    memantine (NAMENDA) tablet 10 mg  10 mg Oral BID Balaji Sommers MD   10 mg at 04/20/25 0836    metoprolol tartrate (LOPRESSOR) tablet 25 mg  25 mg Oral BID Josué Zambrano MD   25 mg at 04/20/25 0838    multivitamin w/minerals (THERA-VIT-M) tablet 1 tablet  1 tablet Oral Daily Balaji Sommers MD   1 tablet at 04/20/25 0835    polyethylene glycol (MIRALAX) Packet 17 g  17 g Oral Daily Balaji Sommers MD   17 g at 04/20/25 0835    senna-docusate (SENOKOT-S/PERICOLACE) 8.6-50 MG per tablet 1 tablet  1 tablet Oral BID Balaji Sommers MD   1 tablet at 04/20/25 0835    sodium chloride (PF) 0.9% PF flush 3 mL  3 mL Intracatheter Q8H [facility] Balaji Sommers MD   3 mL at 04/20/25 0834    tamsulosin (FLOMAX) capsule 0.4 mg  0.4 mg Oral Daily Balaji Sommers MD   0.4 mg at 04/20/25 0836       Data   Recent Labs    Lab 04/20/25  1256 04/20/25  0834 04/20/25  0201 04/19/25  1724 04/19/25  1052 04/19/25  0800 04/19/25  0632   WBC  --   --   --   --  12.2*  --   --    HGB  --   --   --   --  11.2*  --  11.3*   MCV  --   --   --   --  96  --   --    PLT  --   --   --   --  211  --   --    NA  --   --   --   --   --   --  142   POTASSIUM  --   --   --   --   --   --  3.8   CHLORIDE  --   --   --   --   --   --  107   CO2  --   --   --   --   --   --  23   BUN  --   --   --   --   --   --  21.9   CR  --   --   --   --   --   --  1.06   ANIONGAP  --   --   --   --   --   --  12   ABIMBOLA  --   --   --   --   --   --  8.5*   * 149* 176*   < >  --    < > 145*    < > = values in this interval not displayed.       No results found for this or any previous visit (from the past 24 hours).

## 2025-04-21 VITALS
HEIGHT: 66 IN | OXYGEN SATURATION: 99 % | RESPIRATION RATE: 18 BRPM | BODY MASS INDEX: 30.15 KG/M2 | WEIGHT: 187.61 LBS | DIASTOLIC BLOOD PRESSURE: 64 MMHG | HEART RATE: 66 BPM | TEMPERATURE: 99 F | SYSTOLIC BLOOD PRESSURE: 140 MMHG

## 2025-04-21 LAB
ATRIAL RATE - MUSE: 77 BPM
DIASTOLIC BLOOD PRESSURE - MUSE: NORMAL MMHG
EST. AVERAGE GLUCOSE BLD GHB EST-MCNC: 160 MG/DL
GLUCOSE BLDC GLUCOMTR-MCNC: 133 MG/DL (ref 70–99)
GLUCOSE BLDC GLUCOMTR-MCNC: 155 MG/DL (ref 70–99)
HBA1C MFR BLD: 7.2 %
INTERPRETATION ECG - MUSE: NORMAL
P AXIS - MUSE: 72 DEGREES
PR INTERVAL - MUSE: 160 MS
QRS DURATION - MUSE: 150 MS
QT - MUSE: 422 MS
QTC - MUSE: 477 MS
R AXIS - MUSE: 90 DEGREES
SYSTOLIC BLOOD PRESSURE - MUSE: NORMAL MMHG
T AXIS - MUSE: 44 DEGREES
VENTRICULAR RATE- MUSE: 77 BPM

## 2025-04-21 PROCEDURE — 97535 SELF CARE MNGMENT TRAINING: CPT | Mod: GO | Performed by: OCCUPATIONAL THERAPIST

## 2025-04-21 PROCEDURE — 97116 GAIT TRAINING THERAPY: CPT | Mod: GP | Performed by: PHYSICAL THERAPIST

## 2025-04-21 PROCEDURE — 250N000013 HC RX MED GY IP 250 OP 250 PS 637: Performed by: NEUROLOGICAL SURGERY

## 2025-04-21 PROCEDURE — 250N000013 HC RX MED GY IP 250 OP 250 PS 637: Performed by: INTERNAL MEDICINE

## 2025-04-21 PROCEDURE — 97530 THERAPEUTIC ACTIVITIES: CPT | Mod: GP | Performed by: PHYSICAL THERAPIST

## 2025-04-21 RX ADMIN — EMPAGLIFLOZIN 10 MG: 10 TABLET, FILM COATED ORAL at 08:47

## 2025-04-21 RX ADMIN — TAMSULOSIN HYDROCHLORIDE 0.4 MG: 0.4 CAPSULE ORAL at 08:48

## 2025-04-21 RX ADMIN — Medication 1 TABLET: at 08:47

## 2025-04-21 RX ADMIN — POLYETHYLENE GLYCOL 3350 17 G: 17 POWDER, FOR SOLUTION ORAL at 08:49

## 2025-04-21 RX ADMIN — ATORVASTATIN CALCIUM 40 MG: 40 TABLET, FILM COATED ORAL at 08:48

## 2025-04-21 RX ADMIN — METOPROLOL TARTRATE 25 MG: 25 TABLET, FILM COATED ORAL at 08:48

## 2025-04-21 RX ADMIN — GLIPIZIDE 5 MG: 5 TABLET, FILM COATED, EXTENDED RELEASE ORAL at 08:48

## 2025-04-21 RX ADMIN — ACETAMINOPHEN 975 MG: 325 TABLET, FILM COATED ORAL at 01:37

## 2025-04-21 RX ADMIN — MEMANTINE 10 MG: 5 TABLET ORAL at 08:48

## 2025-04-21 RX ADMIN — SENNOSIDES AND DOCUSATE SODIUM 1 TABLET: 50; 8.6 TABLET ORAL at 08:47

## 2025-04-21 RX ADMIN — DONEPEZIL HYDROCHLORIDE 10 MG: 10 TABLET ORAL at 08:47

## 2025-04-21 RX ADMIN — METHOCARBAMOL 750 MG: 500 TABLET ORAL at 10:22

## 2025-04-21 RX ADMIN — FERROUS SULFATE TAB 325 MG (65 MG ELEMENTAL FE) 325 MG: 325 (65 FE) TAB at 08:47

## 2025-04-21 RX ADMIN — ACETAMINOPHEN 975 MG: 325 TABLET, FILM COATED ORAL at 08:47

## 2025-04-21 ASSESSMENT — ACTIVITIES OF DAILY LIVING (ADL)
ADLS_ACUITY_SCORE: 45

## 2025-04-21 NOTE — PLAN OF CARE
Afebrile.  Pain managed with PO pain meds, declined cold.  No nausea.  LS clear bilat., RA, encouraged hourly CDB/IS x10.  CMS+.  UMER drain DC.  Sterile drsg changed, incision approximated no drainage, [place] drsg CDI.  Up SBA belt + walker, braced OOB, ambulating hallway.  Voiding.  Pt hopeful DC home tomorrow.    Goal Outcome Evaluation:    Plan of Care Reviewed With: patient, spouse    Overall Patient Progress: improvingOverall Patient Progress: improving    Outcome Evaluation: No syncope.  Pain managed with oral meds.  No nausea.    Problem: Delirium  Goal: Optimal Coping  Outcome: Progressing  Intervention: Optimize Psychosocial Adjustment to Delirium  Recent Flowsheet Documentation  Taken 4/20/2025 0835 by Nelly Matos RN  Family/Support System Care:   presence promoted   involvement promoted   self-care encouraged   support provided  Goal: Improved Behavioral Control  Outcome: Progressing  Intervention: Prevent and Manage Agitation  Recent Flowsheet Documentation  Taken 4/20/2025 0835 by Nelly Matos RN  Environment Familiarity/Consistency: daily routine followed  Intervention: Minimize Safety Risk  Recent Flowsheet Documentation  Taken 4/20/2025 0835 by Nelly Matos, SARA  Trust Relationship/Rapport:   care explained   choices provided   emotional support provided   empathic listening provided   questions answered   reassurance provided   thoughts/feelings acknowledged  Goal: Improved Attention and Thought Clarity  Outcome: Progressing  Goal: Improved Sleep  Outcome: Progressing     Problem: Adult Inpatient Plan of Care  Goal: Plan of Care Review  Description: The Plan of Care Review/Shift note should be completed every shift.  The Outcome Evaluation is a brief statement about your assessment that the patient is improving, declining, or no change.  This information will be displayed automatically on your shiftnote.  Outcome: Progressing  Flowsheets (Taken 4/20/2025 1957)  Outcome Evaluation: No  "syncope.  Pain managed with oral meds.  No nausea.  Plan of Care Reviewed With:   patient   spouse  Overall Patient Progress: improving  Goal: Patient-Specific Goal (Individualized)  Description: You can add care plan individualizations to a care plan. Examples of Individualization might be:  \"Parent requests to be called daily at 9am for status\", \"I have a hard time hearing out of my right ear\", or \"Do not touch me to wake me up as it startlesme\".  Outcome: Progressing  Goal: Absence of Hospital-Acquired Illness or Injury  Outcome: Progressing  Intervention: Prevent Skin Injury  Recent Flowsheet Documentation  Taken 4/20/2025 1821 by Nelly Matos RN  Body Position:   position changed independently   legs elevated  Taken 4/20/2025 1750 by Nelly Matos RN  Body Position: position changed independently  Taken 4/20/2025 1701 by Nelly Matos RN  Body Position: position changed independently  Taken 4/20/2025 1410 by Nelly Matos RN  Body Position: position changed independently  Intervention: Prevent and Manage VTE (Venous Thromboembolism) Risk  Recent Flowsheet Documentation  Taken 4/20/2025 0835 by Nelly Matos RN  VTE Prevention/Management: SCDs on (sequential compression devices)  Goal: Optimal Comfort and Wellbeing  Outcome: Progressing  Intervention: Monitor Pain and Promote Comfort  Recent Flowsheet Documentation  Taken 4/20/2025 1821 by Nelly Matos RN  Pain Management Interventions: rest  Taken 4/20/2025 1750 by Nelly Matos RN  Pain Management Interventions: rest  Taken 4/20/2025 1701 by Nelly Matos RN  Pain Management Interventions: (declined cold pack)   medication (see MAR)   ambulation/increased activity   repositioned   pillow support provided   care clustered   emotional support  Taken 4/20/2025 1410 by Nelly Matso RN  Pain Management Interventions: (declined cold pack) rest  Taken 4/20/2025 1300 by Nelly Matos RN  Pain Management Interventions: (declined cold pack)   rest   " relaxation techniques promoted   quiet environment facilitated   pillow support provided   medication offered but refused   care clustered   emotional support  Taken 4/20/2025 1020 by Nelly Matos RN  Pain Management Interventions: (declined cold pack) rest  Taken 4/20/2025 0930 by Nelly Matos RN  Pain Management Interventions: ambulation/increased activity  Taken 4/20/2025 0837 by Nelly Matos RN  Pain Management Interventions:   pain management plan reviewed with patient/caregiver   medication (see MAR)   care clustered   cold applied   emotional support   rest   pillow support provided  Intervention: Provide Person-Centered Care  Recent Flowsheet Documentation  Taken 4/20/2025 0835 by Nelly Matos RN  Trust Relationship/Rapport:   care explained   choices provided   emotional support provided   empathic listening provided   questions answered   reassurance provided   thoughts/feelings acknowledged  Goal: Readiness for Transition of Care  Outcome: Progressing     Problem: Pain Acute  Goal: Optimal Pain Control and Function  Outcome: Progressing  Intervention: Optimize Psychosocial Wellbeing  Recent Flowsheet Documentation  Taken 4/20/2025 0835 by Nelly Matos RN  Diversional Activities: television  Intervention: Develop Pain Management Plan  Recent Flowsheet Documentation  Taken 4/20/2025 1821 by Nelly Matos RN  Pain Management Interventions: rest  Taken 4/20/2025 1750 by Nelly Matos RN  Pain Management Interventions: rest  Taken 4/20/2025 1701 by Nelly Matos RN  Pain Management Interventions: (declined cold pack)   medication (see MAR)   ambulation/increased activity   repositioned   pillow support provided   care clustered   emotional support  Taken 4/20/2025 1410 by Nelly Matos RN  Pain Management Interventions: (declined cold pack) rest  Taken 4/20/2025 1300 by Nelly Matos RN  Pain Management Interventions: (declined cold pack)   rest   relaxation techniques promoted   quiet  environment facilitated   pillow support provided   medication offered but refused   care clustered   emotional support  Taken 4/20/2025 1020 by Nelly Matos RN  Pain Management Interventions: (declined cold pack) rest  Taken 4/20/2025 0930 by Nelly Matos RN  Pain Management Interventions: ambulation/increased activity  Taken 4/20/2025 0837 by Nelly Matos RN  Pain Management Interventions:   pain management plan reviewed with patient/caregiver   medication (see MAR)   care clustered   cold applied   emotional support   rest   pillow support provided     Problem: Spinal Surgery  Goal: Optimal Coping with Surgery  Outcome: Progressing  Intervention: Support Psychosocial Response to Surgery  Recent Flowsheet Documentation  Taken 4/20/2025 0835 by Nelly Matos RN  Family/Support System Care:   presence promoted   involvement promoted   self-care encouraged   support provided  Goal: Absence of Bleeding  Outcome: Progressing  Goal: Effective Bowel Elimination  Outcome: Progressing  Goal: Fluid and Electrolyte Balance  Outcome: Progressing  Goal: Optimal Functional Ability  Outcome: Progressing  Intervention: Optimize Functional Status  Recent Flowsheet Documentation  Taken 4/20/2025 1821 by Nelly Matos RN  Activity Management: activity encouraged  Positioning/Transfer Devices: pillows  Taken 4/20/2025 1750 by Nelly Matos RN  Activity Management: activity encouraged  Positioning/Transfer Devices: pillows  Taken 4/20/2025 1701 by Nelly Matos RN  Activity Management: (declined ambulating hallway until after eating supper)   ambulated to bathroom   ambulated in room   up in chair  Taken 4/20/2025 1410 by Nelly Matos RN  Activity Management: up in chair  Positioning/Transfer Devices: pillows  Taken 4/20/2025 1300 by Nelly Matos RN  Activity Management: activity encouraged  Taken 4/20/2025 1020 by Nelly Matos RN  Activity Management: up in chair  Taken 4/20/2025 0930 by Nelly Matos RN  Activity  Management: ambulated outside room  Taken 4/20/2025 0837 by Nelly Matos RN  Activity Management: activity encouraged  Taken 4/20/2025 0825 by Nelly Matos RN  Activity Management:   ambulated outside room   ambulated in room   up in chair  Goal: Absence of Infection Signs and Symptoms  Outcome: Progressing  Goal: Optimal Neurologic Function  Outcome: Progressing  Intervention: Optimize Neurologic Function  Recent Flowsheet Documentation  Taken 4/20/2025 1821 by Nelly Matos RN  Body Position:   position changed independently   legs elevated  Taken 4/20/2025 1750 by Nelly Matos RN  Body Position: position changed independently  Taken 4/20/2025 1701 by Nelly Matos RN  Body Position: position changed independently  Taken 4/20/2025 1410 by Nelly Matos RN  Body Position: position changed independently  Goal: Anesthesia/Sedation Recovery  Outcome: Progressing  Intervention: Optimize Anesthesia Recovery  Recent Flowsheet Documentation  Taken 4/20/2025 0835 by Nelly Matos RN  Administration (IS):   instruction provided, initial   proper technique demonstrated   self-administered  Level Incentive Spirometer (mL): 1500  Number of Repetitions (IS): 10  Goal: Optimal Pain Control and Function  Outcome: Progressing  Intervention: Prevent or Manage Pain  Recent Flowsheet Documentation  Taken 4/20/2025 1821 by Nelly Matos RN  Pain Management Interventions: rest  Taken 4/20/2025 1750 by Nelly Matos RN  Pain Management Interventions: rest  Taken 4/20/2025 1701 by Nelly Matos RN  Pain Management Interventions: (declined cold pack)   medication (see MAR)   ambulation/increased activity   repositioned   pillow support provided   care clustered   emotional support  Taken 4/20/2025 1410 by Nelly Matos RN  Pain Management Interventions: (declined cold pack) rest  Taken 4/20/2025 1300 by Nelly Matos RN  Pain Management Interventions: (declined cold pack)   rest   relaxation techniques promoted   quiet  environment facilitated   pillow support provided   medication offered but refused   care clustered   emotional support  Taken 4/20/2025 1020 by Nelly Matos, SARA  Pain Management Interventions: (declined cold pack) rest  Taken 4/20/2025 0930 by Nelly Matos RN  Pain Management Interventions: ambulation/increased activity  Taken 4/20/2025 0837 by Nelly Matos RN  Pain Management Interventions:   pain management plan reviewed with patient/caregiver   medication (see MAR)   care clustered   cold applied   emotional support   rest   pillow support provided  Taken 4/20/2025 0835 by Nelly Matos RN  Diversional Activities: television  Goal: Nausea and Vomiting Relief  Outcome: Progressing  Goal: Effective Urinary Elimination  Outcome: Progressing  Goal: Effective Oxygenation and Ventilation  Outcome: Progressing

## 2025-04-21 NOTE — PLAN OF CARE
"Goal Outcome Evaluation:      Plan of Care Reviewed With: patient    Overall Patient Progress: improvingOverall Patient Progress: improving         [id number]  Voiding well, up to bathroom overnight multiple times. Ambulated with nursing staff. Brace oob. Slept well overnight. Dressing is CDI  Discharge home today with family      Problem: Delirium  Goal: Optimal Coping  Outcome: Progressing  Goal: Improved Behavioral Control  Outcome: Progressing  Intervention: Minimize Safety Risk  Recent Flowsheet Documentation  Taken 4/20/2025 2100 by Jose F Durbin RN  Enhanced Safety Measures:   pain management   review medications for side effects with activity  Goal: Improved Attention and Thought Clarity  Outcome: Progressing  Goal: Improved Sleep  Outcome: Progressing     Problem: Adult Inpatient Plan of Care  Goal: Plan of Care Review  Description: The Plan of Care Review/Shift note should be completed every shift.  The Outcome Evaluation is a brief statement about your assessment that the patient is improving, declining, or no change.  This information will be displayed automatically on your shiftnote.  Outcome: Progressing  Flowsheets (Taken 4/21/2025 0422)  Plan of Care Reviewed With: patient  Overall Patient Progress: improving  Goal: Patient-Specific Goal (Individualized)  Description: You can add care plan individualizations to a care plan. Examples of Individualization might be:  \"Parent requests to be called daily at 9am for status\", \"I have a hard time hearing out of my right ear\", or \"Do not touch me to wake me up as it startlesme\".  Outcome: Progressing  Goal: Absence of Hospital-Acquired Illness or Injury  Outcome: Progressing  Intervention: Identify and Manage Fall Risk  Recent Flowsheet Documentation  Taken 4/20/2025 2100 by Jose F Durbin, RN  Safety Promotion/Fall Prevention:   activity supervised   assistive device/personal items within reach   clutter free environment maintained   increased rounding " and observation   nonskid shoes/slippers when out of bed   safety round/check completed  Intervention: Prevent Skin Injury  Recent Flowsheet Documentation  Taken 4/20/2025 2100 by Jose F Durbin RN  Body Position:   position changed independently   legs elevated  Intervention: Prevent and Manage VTE (Venous Thromboembolism) Risk  Recent Flowsheet Documentation  Taken 4/20/2025 2100 by Jose F Durbin RN  VTE Prevention/Management: SCDs on (sequential compression devices)  Intervention: Prevent Infection  Recent Flowsheet Documentation  Taken 4/20/2025 2100 by Jose F Durbin RN  Infection Prevention: single patient room provided  Goal: Optimal Comfort and Wellbeing  Outcome: Progressing  Intervention: Monitor Pain and Promote Comfort  Recent Flowsheet Documentation  Taken 4/20/2025 2100 by Jose F Durbin RN  Pain Management Interventions: rest  Goal: Readiness for Transition of Care  Outcome: Progressing

## 2025-04-21 NOTE — PROGRESS NOTES
Occupational Therapy Discharge Summary    Reason for therapy discharge:    Discharged to home.    Progress towards therapy goal(s). See goals on Care Plan in [facility] electronic health record for goal details.  Goals partially met.  Barriers to achieving goals:   discharge from facility.    Therapy recommendation(s):    No further therapy is recommended. Pt has an Alzheimer's diagnosis, will need regular supervision and reminders by spouse who is his main caregiver.

## 2025-04-21 NOTE — PLAN OF CARE
Physical Therapy Discharge Summary    Reason for therapy discharge:    All goals and outcomes met, no further needs identified.    Progress towards therapy goal(s). See goals on Care Plan in [facility] electronic health record for goal details.  Goals met    Therapy recommendation(s):    Continued therapy is recommended.  Rationale/Recommendations:  All IP PT goals met, recommend use of FWW for mobility. Spouse to assist with reinforcement of safety precautions.

## 2025-04-21 NOTE — PROGRESS NOTES
"[facility]  Neurosurgery Progress Note       3 Days Post-Op  Procedure(s):  1. Right L4-5 complete decompressive laminectomy with complete facetectomy and decompression and exposure of the L4 and L5 roots 2. Placement of Globus Creo pedicle screws bilaterally from L4-5 bilaterally 3. L4-5 posterior lateral fusion with placement of locally harvested autologous bone and Medtronic Magnafuse 4. Use of Stealth and O-arm intraoperative neuronavigation 5. Use of C-arm fluoroscopy and intraoperative microscope    Interval history:   Patient seen lying in bed this AM. He reports incisional low back pain. He denies new lower extremity pain, numbness, tingling, or weakness. He has been walking with walking. He wears his lumbar support brace when out of bed. He is voiding without difficulty. He has not had a BM, denies passing flatus. He reports that his pain is controlled with oxycodone and robaxin.     Vital signs:   Blood pressure (!) 151/65, pulse 65, temperature 98.2  F (36.8  C), temperature source Temporal, resp. rate 16, height 1.676 m (5' 6\"), weight 85.1 kg (187 lb 9.8 oz), SpO2 99%.        Exam:   Knee extension/Quadriceps: Right (5); Left (5)  Knee flexion/hamstrings: Right (5); Left (5)  Great toe dorsiflexion/EHL: Right (5); Left (5)  Foot dorsiflexion/ Tib. Ant: Right (5); Left (5)  Foot plantar flexion/ Gastroc: Right (5); Left (5)    Sensation normal to bilateral upper and lower extremities     Lumbar examination: Clean, dry, and intact dressing. Incision covered.     Imaging:       Lab Results   Component Value Date    WBC 12.2 04/19/2025     Lab Results   Component Value Date    RBC 3.48 04/19/2025     Lab Results   Component Value Date    HGB 11.2 04/20/2025    HGB 13.7 01/11/2021     Lab Results   Component Value Date    HCT 33.3 04/19/2025     No components found for: \"MCT\"  Lab Results   Component Value Date    MCV 96 04/19/2025     Lab Results   Component Value Date    MCH 32.2 04/19/2025 " "    Lab Results   Component Value Date    MCHC 33.6 04/19/2025     Lab Results   Component Value Date    RDW 12.7 04/19/2025     Lab Results   Component Value Date     04/19/2025       Last Basic Metabolic Panel:  Lab Results   Component Value Date     04/19/2025    .4 01/11/2021      Lab Results   Component Value Date    POTASSIUM 3.9 04/20/2025    POTASSIUM 4.57 01/11/2021     Lab Results   Component Value Date    CHLORIDE 107 04/19/2025    CHLORIDE 103.3 01/11/2021     Lab Results   Component Value Date    ABIMBOLA 8.5 04/19/2025    ABIMBOLA 9.4 01/11/2021     Lab Results   Component Value Date    CO2 23 04/19/2025    CO2 28.2 01/11/2021     Lab Results   Component Value Date    BUN 21.9 04/19/2025    BUN 24 01/11/2021    BUN 21.8 01/11/2021     Lab Results   Component Value Date    CR 1.06 04/19/2025    CR 1.10 01/11/2021     Lab Results   Component Value Date     04/21/2025     01/11/2021       No results found for: \"INR\"    A/P:   3 Days Post-Op  Procedure(s):  1. Right L4-5 complete decompressive laminectomy with complete facetectomy and decompression and exposure of the L4 and L5 roots 2. Placement of Globus Creo pedicle screws bilaterally from L4-5 bilaterally 3. L4-5 posterior lateral fusion with placement of locally harvested autologous bone and Medtronic Magnafuse 4. Use of Stealth and O-arm intraoperative neuronavigation 5. Use of C-arm fluoroscopy and intraoperative microscope    Plan:    `1. Discharge home today       Lima Morfin NP-C  [facility]  [phone] (office)  [phone] (Care coordinator)    "

## 2025-04-21 NOTE — DISCHARGE SUMMARY
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 Right L4-5 complete decompressive laminectomy with complete facetectomy and decompression and exposure of the L4 and L5 roots 2. Placement of Globus Creo pedicle screws bilaterally from L4-5 bilaterally 3. L4-5 posterior lateral fusion with placement of locally harvested autologous bone and Medtronic Magnafuse 4. Use of Stealth and O-arm intraoperative neuronavigation 5. Use of C-arm fluoroscopy and intraoperative microscope          Medications Prior to Admission:     Medications Prior to Admission   Medication Sig Dispense Refill Last Dose/Taking    aspirin 81 MG EC tablet Take 81 mg by mouth daily.   Past Week    atorvastatin (LIPITOR) 40 MG tablet Take 40 mg by mouth daily    4/17/2025    blood glucose monitoring (NO BRAND SPECIFIED) test strip USE TO TEST ONCE TO TWICE DAILY   4/17/2025    blood glucose monitoring (SOFTCLIX) lancets USE TO TEST ONCE TO TWICE DAILY   4/17/2025    calcium carbonate 600 mg-vitamin D 400 units (CALTRATE) 600-400 MG-UNIT per tablet Take 1 tablet by mouth  daily   Past Week    cyclobenzaprine (FLEXERIL) 10 MG tablet Take 10 mg by mouth nightly as needed.   4/17/2025    donepezil (ARICEPT) 10 MG tablet Take 10 mg by mouth daily.   4/17/2025    empagliflozin (JARDIANCE) 10 MG TABS tablet Take 10 mg by mouth daily.   Past Week    ferrous sulfate (FEROSUL) 325 (65 Fe) MG tablet Take 325 mg by mouth daily (with breakfast).   Past Week    glipiZIDE (GLUCOTROL XL) 5 MG 24 hr tablet Take 5 mg by mouth every morning.   4/17/2025    losartan-hydrochlorothiazide (HYZAAR) 100-12.5 MG tablet Take 1 tablet by mouth daily.   4/17/2025    memantine (NAMENDA) 10 MG tablet Take 10 mg by mouth 2 times daily.   4/17/2025    metFORMIN (GLUCOPHAGE) 1000 MG tablet Take 1,000 mg by mouth daily (with breakfast).   4/17/2025 Morning    metFORMIN (GLUCOPHAGE) 500 MG tablet Take 500 mg by mouth daily (with dinner).   4/17/2025 Evening    minocycline (MINOCIN) 100 MG capsule Take 100 mg by mouth daily.   4/17/2025    multivitamin (CENTRUM SILVER) tablet Take 1 tablet by mouth daily   Past Week    tamsulosin (FLOMAX) 0.4 MG capsule Take 1 capsule by mouth daily.   4/17/2025    vitamin B-12 (CYANOCOBALAMIN) 1000 MCG tablet Take 1,000 mcg by mouth   Past Week             Discharge Medications:     Current Discharge Medication List        START taking these medications    Details   methocarbamol (ROBAXIN) 750 MG tablet Take 1 tablet (750 mg) by mouth every 6 hours as needed for muscle spasms.  Qty: 60 tablet, Refills: 0    Associated Diagnoses: S/P lumbar fusion      oxyCODONE (ROXICODONE) 5 MG tablet Take 1 tablet (5 mg) by mouth every 4 hours as needed for severe pain.  Qty: 30 tablet, Refills: 0    Associated Diagnoses: S/P lumbar fusion           CONTINUE these medications which have NOT CHANGED    Details   aspirin 81 MG EC tablet Take 81 mg by mouth daily.      atorvastatin (LIPITOR) 40 MG tablet Take 40 mg by mouth daily       blood glucose monitoring (NO BRAND SPECIFIED) test strip USE TO  TEST ONCE TO TWICE DAILY      blood glucose monitoring (SOFTCLIX) lancets USE TO TEST ONCE TO TWICE DAILY      calcium carbonate 600 mg-vitamin D 400 units (CALTRATE) 600-400 MG-UNIT per tablet Take 1 tablet by mouth daily      cyclobenzaprine (FLEXERIL) 10 MG tablet Take 10 mg by mouth nightly as needed.      donepezil (ARICEPT) 10 MG tablet Take 10 mg by mouth daily.      empagliflozin (JARDIANCE) 10 MG TABS tablet Take 10 mg by mouth daily.      ferrous sulfate (FEROSUL) 325 (65 Fe) MG tablet Take 325 mg by mouth daily (with breakfast).      glipiZIDE (GLUCOTROL XL) 5 MG 24 hr tablet Take 5 mg by mouth every morning.      losartan-hydrochlorothiazide (HYZAAR) 100-12.5 MG tablet Take 1 tablet by mouth daily.      memantine (NAMENDA) 10 MG tablet Take 10 mg by mouth 2 times daily.      !! metFORMIN (GLUCOPHAGE) 1000 MG tablet Take 1,000 mg by mouth daily (with breakfast).      !! metFORMIN (GLUCOPHAGE) 500 MG tablet Take 500 mg by mouth daily (with dinner).      minocycline (MINOCIN) 100 MG capsule Take 100 mg by mouth daily.      multivitamin (CENTRUM SILVER) tablet Take 1 tablet by mouth daily      tamsulosin (FLOMAX) 0.4 MG capsule Take 1 capsule by mouth daily.      vitamin B-12 (CYANOCOBALAMIN) 1000 MCG tablet Take 1,000 mcg by mouth       !! - Potential duplicate medications found. Please discuss with provider.                Consultations:   PT  OT  Hospitalist           Brief History of Illness:    Aramis Flores is a 84 year old male that is 3 Days Post-Op s/p Procedure(s):  1. Right L4-5 complete decompressive laminectomy with complete facetectomy and decompression and exposure of the L4 and L5 roots 2. Placement of Globus Creo pedicle screws bilaterally from L4-5 bilaterally 3. L4-5 posterior lateral fusion with placement of locally harvested autologous bone and Medtronic Magnafuse 4. Use of Stealth and O-arm intraoperative neuronavigation 5. Use of C-arm fluoroscopy and intraoperative microscope.           Hospital Course:   The patient did well post op and was able to work with PT and OT. The patient's pain was controlled and ambulation was stable. He was stable for discharge home.          Discharge Instructions and Follow-Up:     Discharge diet: Regular normal diet   Discharge activity: Lifting restricted to 10 pounds until follow up  No lifting or strenuous exercise for 6 week(s)  No heavy lifting, pushing, pulling for 6 week(s)  Do not submerge your incision underwater as in hot tub, pool or lake water for 6 weeks. Ok to take showers and bathe in water below your incision.   Discharge follow-up:   Schedule Neurosurgery follow up appointment with either Lima Morfin CNP and Dr. Balaji Sommers at [facility] by calling the Spine Line at [phone] or [phone] for [facility] general number in 4-6 weeks.    If sutures or staples were placed, please schedule an appointment for wound check and staple/suture removal in 10-14 days by calling the Spine Line at [phone] or by calling the general [facility] line at [phone].            Discharge Disposition:     Discharged to home      Attestation:  I have reviewed today's vital signs, notes, medications, labs and imaging.  Amount of time performed on this discharge summary: 10 minutes.    Lima Morfin NP

## 2025-06-02 ENCOUNTER — HOSPITAL ENCOUNTER (EMERGENCY)
Facility: CLINIC | Age: 85
Discharge: HOME | End: 2025-06-02
Attending: EMERGENCY MEDICINE | Admitting: EMERGENCY MEDICINE
Payer: MEDICARE

## 2025-06-02 ENCOUNTER — APPOINTMENT (OUTPATIENT)
Dept: GENERAL RADIOLOGY | Facility: CLINIC | Age: 85
End: 2025-06-02
Attending: EMERGENCY MEDICINE
Payer: MEDICARE

## 2025-06-02 VITALS
BODY MASS INDEX: 29.48 KG/M2 | OXYGEN SATURATION: 99 % | HEIGHT: 66 IN | RESPIRATION RATE: 24 BRPM | HEART RATE: 69 BPM | WEIGHT: 183.42 LBS | DIASTOLIC BLOOD PRESSURE: 50 MMHG | TEMPERATURE: 97.3 F | SYSTOLIC BLOOD PRESSURE: 116 MMHG

## 2025-06-02 DIAGNOSIS — R42 DIZZINESS: ICD-10-CM

## 2025-06-02 LAB
ANION GAP SERPL CALCULATED.3IONS-SCNC: 15 MMOL/L (ref 7–15)
ATRIAL RATE - MUSE: 77 BPM
BASOPHILS # BLD AUTO: 0 10E3/UL (ref 0–0.2)
BASOPHILS NFR BLD AUTO: 0 %
BUN SERPL-MCNC: 27.9 MG/DL (ref 8–23)
CALCIUM SERPL-MCNC: 9.2 MG/DL (ref 8.8–10.4)
CHLORIDE SERPL-SCNC: 100 MMOL/L (ref 98–107)
CREAT SERPL-MCNC: 1.35 MG/DL (ref 0.67–1.17)
DIASTOLIC BLOOD PRESSURE - MUSE: NORMAL MMHG
EGFRCR SERPLBLD CKD-EPI 2021: 51 ML/MIN/1.73M2
EOSINOPHIL # BLD AUTO: 0 10E3/UL (ref 0–0.7)
EOSINOPHIL NFR BLD AUTO: 0 %
ERYTHROCYTE [DISTWIDTH] IN BLOOD BY AUTOMATED COUNT: 13.3 % (ref 10–15)
GLUCOSE BLDC GLUCOMTR-MCNC: 185 MG/DL (ref 70–99)
GLUCOSE SERPL-MCNC: 205 MG/DL (ref 70–99)
HCO3 SERPL-SCNC: 22 MMOL/L (ref 22–29)
HCT VFR BLD AUTO: 33.1 % (ref 40–53)
HGB BLD-MCNC: 11 G/DL (ref 13.3–17.7)
HOLD SPECIMEN: NORMAL
HOLD SPECIMEN: NORMAL
IMM GRANULOCYTES # BLD: 0.1 10E3/UL
IMM GRANULOCYTES NFR BLD: 1 %
INTERPRETATION ECG - MUSE: NORMAL
LYMPHOCYTES # BLD AUTO: 2.6 10E3/UL (ref 0.8–5.3)
LYMPHOCYTES NFR BLD AUTO: 26 %
MCH RBC QN AUTO: 32.4 PG (ref 26.5–33)
MCHC RBC AUTO-ENTMCNC: 33.2 G/DL (ref 31.5–36.5)
MCV RBC AUTO: 97 FL (ref 78–100)
MONOCYTES # BLD AUTO: 0.5 10E3/UL (ref 0–1.3)
MONOCYTES NFR BLD AUTO: 5 %
NEUTROPHILS # BLD AUTO: 6.8 10E3/UL (ref 1.6–8.3)
NEUTROPHILS NFR BLD AUTO: 68 %
NRBC # BLD AUTO: 0 10E3/UL
NRBC BLD AUTO-RTO: 0 /100
P AXIS - MUSE: 69 DEGREES
PLATELET # BLD AUTO: 226 10E3/UL (ref 150–450)
POTASSIUM SERPL-SCNC: 4.5 MMOL/L (ref 3.4–5.3)
PR INTERVAL - MUSE: 150 MS
QRS DURATION - MUSE: 138 MS
QT - MUSE: 438 MS
QTC - MUSE: 495 MS
R AXIS - MUSE: 62 DEGREES
RBC # BLD AUTO: 3.4 10E6/UL (ref 4.4–5.9)
SODIUM SERPL-SCNC: 137 MMOL/L (ref 135–145)
SYSTOLIC BLOOD PRESSURE - MUSE: NORMAL MMHG
T AXIS - MUSE: 21 DEGREES
TROPONIN T SERPL HS-MCNC: 26 NG/L
TROPONIN T SERPL HS-MCNC: 32 NG/L
VENTRICULAR RATE- MUSE: 77 BPM
WBC # BLD AUTO: 10 10E3/UL (ref 4–11)

## 2025-06-02 PROCEDURE — 258N000003 HC RX IP 258 OP 636: Performed by: EMERGENCY MEDICINE

## 2025-06-02 PROCEDURE — 96360 HYDRATION IV INFUSION INIT: CPT | Performed by: EMERGENCY MEDICINE

## 2025-06-02 PROCEDURE — 36415 COLL VENOUS BLD VENIPUNCTURE: CPT | Performed by: EMERGENCY MEDICINE

## 2025-06-02 PROCEDURE — 82962 GLUCOSE BLOOD TEST: CPT

## 2025-06-02 PROCEDURE — 93005 ELECTROCARDIOGRAM TRACING: CPT | Performed by: EMERGENCY MEDICINE

## 2025-06-02 PROCEDURE — 71046 X-RAY EXAM CHEST 2 VIEWS: CPT

## 2025-06-02 PROCEDURE — 99285 EMERGENCY DEPT VISIT HI MDM: CPT | Mod: 25 | Performed by: EMERGENCY MEDICINE

## 2025-06-02 PROCEDURE — 85025 COMPLETE CBC W/AUTO DIFF WBC: CPT | Performed by: EMERGENCY MEDICINE

## 2025-06-02 PROCEDURE — 80048 BASIC METABOLIC PNL TOTAL CA: CPT | Performed by: EMERGENCY MEDICINE

## 2025-06-02 PROCEDURE — 84484 ASSAY OF TROPONIN QUANT: CPT | Performed by: EMERGENCY MEDICINE

## 2025-06-02 RX ADMIN — SODIUM CHLORIDE 1000 ML: 0.9 INJECTION, SOLUTION INTRAVENOUS at 14:00

## 2025-06-02 ASSESSMENT — COLUMBIA-SUICIDE SEVERITY RATING SCALE - C-SSRS
2. HAVE YOU ACTUALLY HAD ANY THOUGHTS OF KILLING YOURSELF IN THE PAST MONTH?: NO
1. IN THE PAST MONTH, HAVE YOU WISHED YOU WERE DEAD OR WISHED YOU COULD GO TO SLEEP AND NOT WAKE UP?: NO
6. HAVE YOU EVER DONE ANYTHING, STARTED TO DO ANYTHING, OR PREPARED TO DO ANYTHING TO END YOUR LIFE?: NO

## 2025-06-02 ASSESSMENT — ACTIVITIES OF DAILY LIVING (ADL)
ADLS_ACUITY_SCORE: 53

## 2025-06-02 NOTE — ED TRIAGE NOTES
Pt arrives via EMS with c/o low blood pressure. Per EMS report, pt was sitting in a vehicle waiting for his wife and suddenly became sweaty and felt weak. Pt denies chest pain or dizziness. Initial systolic BP in the 80's, EMS gave 250-300mL of IVF per EMS. Pt was noted to have a bundle branch block on the 12 lead which pt denies having a history of.

## 2025-06-02 NOTE — ED PROVIDER NOTES
Emergency Department Note      History of Present Illness     Chief Complaint   Sweats and Generalized Weakness      HPI   Aramis Flores is a 84 year old male with history of DM2 and hypertension  who presents to the ED via EMS with his wife for evaluation of generalized weakness. The patient reports that he was sitting in a car with his wife 1130 when he started feeling dizzy, weak, and diaphoretic. His wife endorses that he remained dizzy and unable to get out of the car to walk for about an hour before EMS arrived. The patient denies any past cardiac history, chest pain, palpitations, new back pain, shortness of breath, fever, head ache, extremity weakness, hematochezia, changes in vision/speech. He states that he had lumbar surgery on 4/18 and has been taking oxycodone to manage his pain. Of note, he states that he has had some numbness in his toes which he thinks is related to his diabetes and this has been ongoing, and he is going to see a primary care physician for this and was wondering if he had diabetic neuropathy.  He is feeling better currently.  He did have normal peanut butter and toast for breakfast, he has not yet eating lunch.    Independent Historian   Wife as detailed above.    Review of External Notes   Discharge summary from 4/18 - 4/21;lumbar fusion    Past Medical History     Medical History and Problem List   Alzheimer's disease (H)  Degenerative joint disease  Diabetes (H)  Hypertension  Rosacea    Medications   aspirin 81 MG EC tablet  atorvastatin (LIPITOR) 40 MG tablet  donepezil (ARICEPT) 10 MG tablet  empagliflozin (JARDIANCE) 10 MG TABS tablet  ferrous sulfate (FEROSUL) 325 (65 Fe) MG tablet  glipiZIDE (GLUCOTROL XL) 5 MG 24 hr tablet  losartan-hydrochlorothiazide (HYZAAR) 100-12.5 MG tablet  memantine (NAMENDA) 10 MG tablet  metFORMIN (GLUCOPHAGE) 1000 MG tablet  methocarbamol (ROBAXIN) 750 MG tablet  minocycline (MINOCIN) 100 MG capsule  oxyCODONE (ROXICODONE) 5 MG  "tablet    Surgical History   Past Surgical History:   Procedure Laterality Date    ARTHROPLASTY KNEE Left 3/7/2019    Procedure: LEFT TOTAL KNEE ARTHROPLASTY;  Surgeon: Mario Boland MD;  Location: [facility] OR    CATARACT IOL, RT/LT      HERNIA REPAIR      OPTICAL TRACKING SYSTEM FUSION POSTERIOR SPINE LUMBAR Bilateral 4/18/2025    Procedure: Right L4-5 complete decompressive laminectomy with complete facetectomy and decompression and exposure of the L4 and L5 roots Placement of Globus Creo pedicle screws bilaterally from L4-5 bilaterally L4-5 posterior lateral fusion with placement of locally harvested autologous bone and Medtronic Magnafuse Use of Stealth and O-arm intraoperative neuronavigation Use of C-arm fluoroscopy and     Physical Exam     Patient Vitals for the past 24 hrs:   BP Temp Temp src Pulse Resp SpO2 Height Weight   06/02/25 1700 -- -- -- -- 24 -- -- --   06/02/25 1650 116/50 -- -- 69 -- 99 % -- --   06/02/25 1640 -- -- -- 73 -- 100 % -- --   06/02/25 1630 124/67 -- -- 78 -- 100 % -- --   06/02/25 1620 115/55 -- -- 75 -- 100 % -- --   06/02/25 1555 -- -- -- 68 -- 100 % -- --   06/02/25 1550 -- -- -- 66 -- 100 % -- --   06/02/25 1545 117/56 -- -- 73 -- 99 % -- --   06/02/25 1440 -- -- -- 74 -- 100 % -- --   06/02/25 1435 -- -- -- 71 -- 100 % -- --   06/02/25 1430 97/47 -- -- 72 -- 99 % -- --   06/02/25 1425 -- -- -- 73 -- 99 % -- --   06/02/25 1420 -- -- -- 70 -- 100 % -- --   06/02/25 1415 114/50 -- -- 69 -- 100 % -- --   06/02/25 1330 120/56 -- -- 78 -- 100 % -- --   06/02/25 1316 109/47 97.3  F (36.3  C) Oral 78 18 99 % 1.676 m (5' 6\") 83.2 kg (183 lb 6.8 oz)     Physical Exam  GENERAL: Awake, alert  CARDIOVASCULAR: Regular rate and rhythm  LUNGS: Clear bilaterally, no wheezes rales or rhonchi  BACK: Well healing surgical scar, no surrounding erythema. No midspine tenderness  ABDOMEN: Soft, nontender, no rebound or guarding  EXTREMITIES: No foot ulcers.  NEUROLOGICAL: Patient is awake, face is " symmetric, tongue is midline, extraocular muscles intact, no dysarthria, no dysmetria on finger-to-nose, 5 out of 5 strength throughout and sensation is normal    Diagnostics     Lab Results   Labs Ordered and Resulted from Time of ED Arrival to Time of ED Departure   BASIC METABOLIC PANEL - Abnormal       Result Value    Sodium 137      Potassium 4.5      Chloride 100      Carbon Dioxide (CO2) 22      Anion Gap 15      Urea Nitrogen 27.9 (*)     Creatinine 1.35 (*)     GFR Estimate 51 (*)     Calcium 9.2      Glucose 205 (*)    TROPONIN T, HIGH SENSITIVITY - Abnormal    Troponin T, High Sensitivity 32 (*)    CBC WITH PLATELETS AND DIFFERENTIAL - Abnormal    WBC Count 10.0      RBC Count 3.40 (*)     Hemoglobin 11.0 (*)     Hematocrit 33.1 (*)     MCV 97      MCH 32.4      MCHC 33.2      RDW 13.3      Platelet Count 226      % Neutrophils 68      % Lymphocytes 26      % Monocytes 5      % Eosinophils 0      % Basophils 0      % Immature Granulocytes 1      NRBCs per 100 WBC 0      Absolute Neutrophils 6.8      Absolute Lymphocytes 2.6      Absolute Monocytes 0.5      Absolute Eosinophils 0.0      Absolute Basophils 0.0      Absolute Immature Granulocytes 0.1      Absolute NRBCs 0.0     GLUCOSE BY METER - Abnormal    GLUCOSE BY METER POCT 185 (*)    TROPONIN T, HIGH SENSITIVITY - Abnormal    Troponin T, High Sensitivity 26 (*)    GLUCOSE MONITOR NURSING POCT       Imaging   Chest XR,  PA & LAT   Final Result   IMPRESSION: Negative chest. Lungs clear.          EKG   ECG taken at 1327, ECG read at 1510  Normal sinus rhythm   Right bundle branch block   Cannot rule out inferior infarct, age undetermined    No significant as compared to prior, dated 4/19/25.  Rate 77 bpm. NC interval 150 ms. QRS duration 138 ms. QT/QTc 438/495 ms. P-R-T axes 69 62 21.    Independent Interpretation   CXR: No infiltrate.    ED Course      Medications Administered   Medications   sodium chloride 0.9% BOLUS 1,000 mL (0 mLs Intravenous  Stopped 6/2/25 1500)     Procedures   Procedures     Discussion of Management   None    ED Course   ED Course as of 06/02/25 1813   Mon Jun 02, 2025   1342 I obtained history and examined the patient as noted above.        Additional Documentation  None    Medical Decision Making / Diagnosis     CMS Diagnoses: None    MIPS   None               [facility]   Aramis Flores is a 84 year old male with history of chronic back pain, recent surgery, who presents emergency department for an episode of some dizziness and diaphoresis that occurred while sitting in a car.  His EKG shows right bundle branch block, unchanged from prior, otherwise no acute ischemic changes.  Denied any chest pain shortness of breath or palpitations.  No prior cardiac history.  No show troponin was borderline elevated at 32 but delta troponin was decreased at 26 and patient does have a chronically elevated troponin around 25-29.  Patient had no chest pain or shortness of breath and do not suspect ACS at this point.  Creatinine was mildly elevated to 1.3 compared to around 1 at baseline, patient was given some IV fluids.  He does have some chronic anemia to hemoglobin of 11 which is unchanged.  No leukocytosis.  He was observed for almost 4 hours in the emergency department, his neurological exam was nonfocal, no headache or neurological his symptoms to suggest TIA or CVA at this point.  Patient was able to ambulate without any difficulty to the bathroom.  Denies any dizziness currently.  He feels comfortable going home and his partner will watch him.  I advised him to call his PCP tomorrow, for possible Holter monitor and even echocardiogram but low suspicion for cardiogenic or emergent etiology of his symptoms but he will return if worsening.    Disposition   The patient was discharged.     Diagnosis     ICD-10-CM    1. Dizziness  R42            Discharge Medications   Discharge Medication List as of 6/2/2025  4:52 PM        Scribe Disclosure:  I,  Emir Olea, am serving as a scribe at 2:19 PM on 6/2/2025 to document services personally performed by Angelica Fonseca MD based on my observations and the provider's statements to me.        Angelica Fonseca MD  06/02/25 7400

## (undated) DEVICE — SU STRATAFIX PDS PLUS 0 CT 45CM SXPP1A406

## (undated) DEVICE — ESU GROUND PAD ADULT W/CORD E7507

## (undated) DEVICE — SU MONOCRYL 3-0 PS-2 27" Y427H

## (undated) DEVICE — GLOVE PROTEXIS W/NEU-THERA 8.5  2D73TE85

## (undated) DEVICE — LINEN TOWEL PACK X5 5464

## (undated) DEVICE — SOL NACL 0.9% INJ 250ML BAG 2B1322Q

## (undated) DEVICE — DRAPE X-RAY TUBE 00-901169-01-OEC

## (undated) DEVICE — SUTURE VICRYL+ 0 CT-1 18" DYED VIO VCP740D

## (undated) DEVICE — PREP CHLORAPREP 26ML TINTED ORANGE  260815

## (undated) DEVICE — HOOD FLYTE W/PEELAWAY 408-800-100

## (undated) DEVICE — GLOVE PROTEXIS MICRO 6.5  2D73PM65

## (undated) DEVICE — LINEN ORTHO ACL PACK 5447

## (undated) DEVICE — DRSG TELFA ISLAND 4X10"

## (undated) DEVICE — GLOVE PROTEXIS BLUE W/NEU-THERA 8.5  2D73EB85

## (undated) DEVICE — SUCTION MANIFOLD NEPTUNE 2 SYS 4 PORT 0702-020-000

## (undated) DEVICE — GLOVE PROTEXIS W/NEU-THERA 7.5  2D73TE75

## (undated) DEVICE — SUCTION IRR SYSTEM W/O TIP INTERPULSE HANDPIECE 0210-100-000

## (undated) DEVICE — CATH TRAY FOLEY COUDE SURESTEP 16FR W/URNE MTR STLK A304716A

## (undated) DEVICE — PACK SMALL SPINE RIDGES

## (undated) DEVICE — LINEN POUCH DBL 5427

## (undated) DEVICE — PEN MARKING SKIN W/LABELS 31145884

## (undated) DEVICE — DEVICE DUST COLLECTOR BONE BOX S-3500

## (undated) DEVICE — SOLUTION WOUND CLEANSING 3/4OZ 10% PVP EA-L3011FB-50

## (undated) DEVICE — SUCTION FRAZIER 12FR W/HANDLE K73

## (undated) DEVICE — GLOVE PROTEXIS BLUE W/NEU-THERA 7.0  2D73EB70

## (undated) DEVICE — DRAIN JACKSON PRATT CHANNEL 10FR RND SIL W/TROCAR JP-2227

## (undated) DEVICE — TOOL DISSECT MIDAS MR8 14CM MATCH HD SYM-TRI MR8-14MH30T

## (undated) DEVICE — ESU BIPOLAR SEALER AQUAMANTYS 6MM 23-112-1

## (undated) DEVICE — SPONGE SURGIFOAM 100 1974

## (undated) DEVICE — SYR 50ML LL W/O NDL 309653

## (undated) DEVICE — BLADE SAW RECIP STRK 70X12.5X1.2MM 0277-096-281

## (undated) DEVICE — SPONGE COTTONOID 1/2X1" 80-1402

## (undated) DEVICE — SU DERMABOND PRINEO 22CM CLR222US

## (undated) DEVICE — SOLUTION IV 0.9% NACL 250ML L8002

## (undated) DEVICE — DRAPE IOBAN INCISE 23X17" 6650EZ

## (undated) DEVICE — LINEN DRAPE 54X72" 5467

## (undated) DEVICE — PAD PROAXIS TABLE KIT SPK10182

## (undated) DEVICE — MARKER SPHERES PASSIVE MEDT PACK 5 8801075

## (undated) DEVICE — SOL NACL 0.9% IRRIG 1000ML BOTTLE 07138-09

## (undated) DEVICE — ESU ELEC BLADE 2.75" COATED/INSULATED E1455

## (undated) DEVICE — RX VISTASEAL FIBRIN SEALANT W/THROMBIN 10ML VST10

## (undated) DEVICE — SU STRATAFIX PDS PLUS 2-0 SPIRAL CT-1 30CM SXPP1B410

## (undated) DEVICE — DRSG ABDOMINAL 07 1/2X8" 7197D

## (undated) DEVICE — MANIFOLD NEPTUNE 4 PORT 700-20

## (undated) DEVICE — CUSHION INSERT LG PRONE VIEW JACKSON TABLE

## (undated) DEVICE — DRSG KERLIX FLUFFS X5

## (undated) DEVICE — NDL 21GA 1.5"

## (undated) DEVICE — DRAIN JACKSON PRATT RESERVOIR 100ML SU130-1305

## (undated) DEVICE — Device

## (undated) DEVICE — BONE CLEANING TIP INTERPULSE  0210-010-000

## (undated) DEVICE — WRAP EZY KNEE

## (undated) DEVICE — SU VICRYL 2-0 CT-1 18' J739D

## (undated) DEVICE — NDL 22GA 1.5"

## (undated) DEVICE — ESU PENCIL W/SMOKE EVAC NEPTUNE STRYKER 0703-046-000

## (undated) DEVICE — TUBING SUCTION 12"X1/4" N612

## (undated) DEVICE — RX SURGIFLO HEMOSTATIC MATRIX 8ML 2991

## (undated) DEVICE — BLADE SAW SAGITTAL STRK 19.5X95X1.27MM 2108-109-000S15

## (undated) DEVICE — SU DERMABOND ADVANCED .7ML DNX12

## (undated) DEVICE — ESU CLEANER TIP 31142717

## (undated) DEVICE — BONE CEMENT MIXEVAC III HI VAC KIT  0206-015-000

## (undated) DEVICE — DRAPE MICROSCOPE OPMI ZEISS 48X118" 306071-0000-000

## (undated) DEVICE — SOL WATER IRRIG 1000ML BOTTLE 2F7114

## (undated) DEVICE — ESU GROUND PAD UNIVERSAL W/O CORD

## (undated) DEVICE — PACK SET-UP STD 9102

## (undated) DEVICE — GOWN REINFORCED XXLG 9071

## (undated) DEVICE — GOWN XLG DISP 9545

## (undated) DEVICE — GLOVE PROTEXIS W/NEU-THERA 6.5  2D73TE65

## (undated) DEVICE — PACK TOTAL KNEE SOP15TKFSD

## (undated) DEVICE — CATH TRAY FOLEY COUDE SURESTEP 16FR W/DRN BAG LATEX A304416A

## (undated) DEVICE — SU STRATAFIX MONOCRYL 3-0 SPIRAL PS-1 45CM SXMP1B102

## (undated) DEVICE — VIAL DECANTER STERILE WHITE DYNJDEC06

## (undated) DEVICE — GLOVE PROTEXIS POWDER FREE 8.5 ORTHOPEDIC 2D73ET85

## (undated) DEVICE — DRAPE SHEET REV FOLD 3/4 9349

## (undated) DEVICE — SU STRATAFIX PDS PLUS 0 CT-1 18" SXPP1A401

## (undated) DEVICE — SU VICRYL 0 CTX CR 8X18" J764D

## (undated) DEVICE — SU VICRYL 2-0 CP-1 27" UND J266H

## (undated) DEVICE — BAG DECANTER STERILE WHITE DYNJDEC09

## (undated) RX ORDER — FENTANYL CITRATE 50 UG/ML
INJECTION, SOLUTION INTRAMUSCULAR; INTRAVENOUS
Status: DISPENSED
Start: 2019-03-07

## (undated) RX ORDER — DEXAMETHASONE SODIUM PHOSPHATE 4 MG/ML
INJECTION, SOLUTION INTRA-ARTICULAR; INTRALESIONAL; INTRAMUSCULAR; INTRAVENOUS; SOFT TISSUE
Status: DISPENSED
Start: 2025-04-18

## (undated) RX ORDER — VANCOMYCIN HYDROCHLORIDE 1 G/20ML
INJECTION, POWDER, LYOPHILIZED, FOR SOLUTION INTRAVENOUS
Status: DISPENSED
Start: 2019-03-06

## (undated) RX ORDER — FENTANYL CITRATE 50 UG/ML
INJECTION, SOLUTION INTRAMUSCULAR; INTRAVENOUS
Status: DISPENSED
Start: 2025-04-18

## (undated) RX ORDER — ACETAMINOPHEN 500 MG
TABLET ORAL
Status: DISPENSED
Start: 2019-03-07

## (undated) RX ORDER — BUPIVACAINE HYDROCHLORIDE AND EPINEPHRINE 5; 5 MG/ML; UG/ML
INJECTION, SOLUTION EPIDURAL; INTRACAUDAL; PERINEURAL
Status: DISPENSED
Start: 2019-03-07

## (undated) RX ORDER — BUPIVACAINE HYDROCHLORIDE AND EPINEPHRINE 5; 5 MG/ML; UG/ML
INJECTION, SOLUTION EPIDURAL; INTRACAUDAL; PERINEURAL
Status: DISPENSED
Start: 2025-04-18

## (undated) RX ORDER — ONDANSETRON 2 MG/ML
INJECTION INTRAMUSCULAR; INTRAVENOUS
Status: DISPENSED
Start: 2019-03-07

## (undated) RX ORDER — ACYCLOVIR 200 MG/1
CAPSULE ORAL
Status: DISPENSED
Start: 2019-03-07

## (undated) RX ORDER — ONDANSETRON 2 MG/ML
INJECTION INTRAMUSCULAR; INTRAVENOUS
Status: DISPENSED
Start: 2025-04-18

## (undated) RX ORDER — FENTANYL CITRATE-0.9 % NACL/PF 10 MCG/ML
PLASTIC BAG, INJECTION (ML) INTRAVENOUS
Status: DISPENSED
Start: 2025-04-18

## (undated) RX ORDER — KETOROLAC TROMETHAMINE 30 MG/ML
INJECTION, SOLUTION INTRAMUSCULAR; INTRAVENOUS
Status: DISPENSED
Start: 2019-03-07

## (undated) RX ORDER — VANCOMYCIN HYDROCHLORIDE 1 G/20ML
INJECTION, POWDER, LYOPHILIZED, FOR SOLUTION INTRAVENOUS
Status: DISPENSED
Start: 2025-04-18

## (undated) RX ORDER — PREGABALIN 75 MG/1
CAPSULE ORAL
Status: DISPENSED
Start: 2019-03-07

## (undated) RX ORDER — CEFAZOLIN SODIUM 2 G/100ML
INJECTION, SOLUTION INTRAVENOUS
Status: DISPENSED
Start: 2019-03-07

## (undated) RX ORDER — VANCOMYCIN HYDROCHLORIDE 1 G/20ML
INJECTION, POWDER, LYOPHILIZED, FOR SOLUTION INTRAVENOUS
Status: DISPENSED
Start: 2019-03-07

## (undated) RX ORDER — HYDROMORPHONE HYDROCHLORIDE 1 MG/ML
INJECTION, SOLUTION INTRAMUSCULAR; INTRAVENOUS; SUBCUTANEOUS
Status: DISPENSED
Start: 2019-03-07

## (undated) RX ORDER — CELECOXIB 200 MG/1
CAPSULE ORAL
Status: DISPENSED
Start: 2019-03-07

## (undated) RX ORDER — PROPOFOL 10 MG/ML
INJECTION, EMULSION INTRAVENOUS
Status: DISPENSED
Start: 2025-04-18

## (undated) RX ORDER — PROPOFOL 10 MG/ML
INJECTION, EMULSION INTRAVENOUS
Status: DISPENSED
Start: 2019-03-07

## (undated) RX ORDER — DEXAMETHASONE SODIUM PHOSPHATE 4 MG/ML
INJECTION, SOLUTION INTRA-ARTICULAR; INTRALESIONAL; INTRAMUSCULAR; INTRAVENOUS; SOFT TISSUE
Status: DISPENSED
Start: 2019-03-07

## (undated) RX ORDER — LIDOCAINE HYDROCHLORIDE 20 MG/ML
INJECTION, SOLUTION EPIDURAL; INFILTRATION; INTRACAUDAL; PERINEURAL
Status: DISPENSED
Start: 2019-03-07

## (undated) RX ORDER — CEFAZOLIN SODIUM/WATER 2 G/20 ML
SYRINGE (ML) INTRAVENOUS
Status: DISPENSED
Start: 2025-04-18

## (undated) RX ORDER — PHENYLEPHRINE HYDROCHLORIDE 10 MG/ML
INJECTION INTRAVENOUS
Status: DISPENSED
Start: 2025-04-18

## (undated) RX ORDER — ROPIVACAINE HYDROCHLORIDE 5 MG/ML
INJECTION, SOLUTION EPIDURAL; INFILTRATION; PERINEURAL
Status: DISPENSED
Start: 2019-03-06

## (undated) RX ORDER — CEFAZOLIN SODIUM 1 G/3ML
INJECTION, POWDER, FOR SOLUTION INTRAMUSCULAR; INTRAVENOUS
Status: DISPENSED
Start: 2019-03-07

## (undated) RX ORDER — EPINEPHRINE 1 MG/ML
INJECTION, SOLUTION INTRAMUSCULAR; SUBCUTANEOUS
Status: DISPENSED
Start: 2019-03-06

## (undated) RX ORDER — CEFAZOLIN SODIUM 500 MG/2.2ML
INJECTION, POWDER, FOR SOLUTION INTRAMUSCULAR; INTRAVENOUS
Status: DISPENSED
Start: 2025-04-18

## (undated) RX ORDER — LIDOCAINE HYDROCHLORIDE 10 MG/ML
INJECTION, SOLUTION EPIDURAL; INFILTRATION; INTRACAUDAL; PERINEURAL
Status: DISPENSED
Start: 2025-04-18